# Patient Record
Sex: FEMALE | Race: OTHER | HISPANIC OR LATINO | Employment: UNEMPLOYED | URBAN - METROPOLITAN AREA
[De-identification: names, ages, dates, MRNs, and addresses within clinical notes are randomized per-mention and may not be internally consistent; named-entity substitution may affect disease eponyms.]

---

## 2020-01-03 ENCOUNTER — HOSPITAL ENCOUNTER (EMERGENCY)
Facility: HOSPITAL | Age: 25
Discharge: HOME | End: 2020-01-03
Attending: EMERGENCY MEDICINE | Admitting: EMERGENCY MEDICINE
Payer: COMMERCIAL

## 2020-01-03 VITALS
RESPIRATION RATE: 15 BRPM | DIASTOLIC BLOOD PRESSURE: 71 MMHG | HEART RATE: 74 BPM | OXYGEN SATURATION: 100 % | SYSTOLIC BLOOD PRESSURE: 119 MMHG | TEMPERATURE: 98.4 F

## 2020-01-03 DIAGNOSIS — R11.2 NON-INTRACTABLE VOMITING WITH NAUSEA, UNSPECIFIED VOMITING TYPE: Primary | ICD-10-CM

## 2020-01-03 DIAGNOSIS — R10.84 GENERALIZED ABDOMINAL PAIN: ICD-10-CM

## 2020-01-03 LAB
ALBUMIN SERPL BCP-MCNC: 3.5 G/DL (ref 3.5–5)
ALP SERPL-CCNC: 70 U/L (ref 46–116)
ALT SERPL W P-5'-P-CCNC: 15 U/L (ref 12–78)
ANION GAP SERPL CALCULATED.3IONS-SCNC: 7 MMOL/L (ref 4–13)
AST SERPL W P-5'-P-CCNC: 12 U/L (ref 5–45)
BASOPHILS # BLD AUTO: 0.02 THOUSANDS/ΜL (ref 0–0.1)
BASOPHILS NFR BLD AUTO: 0 % (ref 0–1)
BILIRUB SERPL-MCNC: 0.22 MG/DL (ref 0.2–1)
BILIRUB UR QL STRIP: NEGATIVE
BUN SERPL-MCNC: 8 MG/DL (ref 5–25)
CALCIUM SERPL-MCNC: 8.8 MG/DL (ref 8.3–10.1)
CHLORIDE SERPL-SCNC: 106 MMOL/L (ref 100–108)
CLARITY UR: CLEAR
CLARITY, POC: CLEAR
CO2 SERPL-SCNC: 27 MMOL/L (ref 21–32)
COLOR UR: YELLOW
COLOR, POC: YELLOW
CREAT SERPL-MCNC: 0.65 MG/DL (ref 0.6–1.3)
EOSINOPHIL # BLD AUTO: 0.2 THOUSAND/ΜL (ref 0–0.61)
EOSINOPHIL NFR BLD AUTO: 4 % (ref 0–6)
ERYTHROCYTE [DISTWIDTH] IN BLOOD BY AUTOMATED COUNT: 14.1 % (ref 11.6–15.1)
EXT PREG TEST URINE: NEGATIVE
EXT. CONTROL ED NAV: NORMAL
GFR SERPL CREATININE-BSD FRML MDRD: 124 ML/MIN/1.73SQ M
GLUCOSE SERPL-MCNC: 100 MG/DL (ref 65–140)
GLUCOSE UR STRIP-MCNC: NEGATIVE MG/DL
HCT VFR BLD AUTO: 39 % (ref 34.8–46.1)
HGB BLD-MCNC: 12.3 G/DL (ref 11.5–15.4)
HGB UR QL STRIP.AUTO: NEGATIVE
IMM GRANULOCYTES # BLD AUTO: 0.01 THOUSAND/UL (ref 0–0.2)
IMM GRANULOCYTES NFR BLD AUTO: 0 % (ref 0–2)
KETONES UR STRIP-MCNC: NEGATIVE MG/DL
LEUKOCYTE ESTERASE UR QL STRIP: NEGATIVE
LIPASE SERPL-CCNC: 145 U/L (ref 73–393)
LYMPHOCYTES # BLD AUTO: 1.68 THOUSANDS/ΜL (ref 0.6–4.47)
LYMPHOCYTES NFR BLD AUTO: 30 % (ref 14–44)
MAGNESIUM SERPL-MCNC: 1.9 MG/DL (ref 1.6–2.6)
MCH RBC QN AUTO: 27.7 PG (ref 26.8–34.3)
MCHC RBC AUTO-ENTMCNC: 31.5 G/DL (ref 31.4–37.4)
MCV RBC AUTO: 88 FL (ref 82–98)
MONOCYTES # BLD AUTO: 0.33 THOUSAND/ΜL (ref 0.17–1.22)
MONOCYTES NFR BLD AUTO: 6 % (ref 4–12)
NEUTROPHILS # BLD AUTO: 3.35 THOUSANDS/ΜL (ref 1.85–7.62)
NEUTS SEG NFR BLD AUTO: 60 % (ref 43–75)
NITRITE UR QL STRIP: NEGATIVE
NRBC BLD AUTO-RTO: 0 /100 WBCS
PH UR STRIP.AUTO: 6 [PH] (ref 4.5–8)
PLATELET # BLD AUTO: 215 THOUSANDS/UL (ref 149–390)
PMV BLD AUTO: 10.6 FL (ref 8.9–12.7)
POTASSIUM SERPL-SCNC: 4.2 MMOL/L (ref 3.5–5.3)
PROT SERPL-MCNC: 6.7 G/DL (ref 6.4–8.2)
PROT UR STRIP-MCNC: NEGATIVE MG/DL
RBC # BLD AUTO: 4.44 MILLION/UL (ref 3.81–5.12)
SODIUM SERPL-SCNC: 140 MMOL/L (ref 136–145)
SP GR UR STRIP.AUTO: 1.02 (ref 1–1.03)
UROBILINOGEN UR QL STRIP.AUTO: 0.2 E.U./DL
WBC # BLD AUTO: 5.59 THOUSAND/UL (ref 4.31–10.16)

## 2020-01-03 PROCEDURE — 96361 HYDRATE IV INFUSION ADD-ON: CPT

## 2020-01-03 PROCEDURE — 81025 URINE PREGNANCY TEST: CPT | Performed by: EMERGENCY MEDICINE

## 2020-01-03 PROCEDURE — 83690 ASSAY OF LIPASE: CPT | Performed by: NURSE PRACTITIONER

## 2020-01-03 PROCEDURE — 36415 COLL VENOUS BLD VENIPUNCTURE: CPT | Performed by: NURSE PRACTITIONER

## 2020-01-03 PROCEDURE — 83735 ASSAY OF MAGNESIUM: CPT | Performed by: NURSE PRACTITIONER

## 2020-01-03 PROCEDURE — 80053 COMPREHEN METABOLIC PANEL: CPT | Performed by: NURSE PRACTITIONER

## 2020-01-03 PROCEDURE — 81003 URINALYSIS AUTO W/O SCOPE: CPT

## 2020-01-03 PROCEDURE — 96374 THER/PROPH/DIAG INJ IV PUSH: CPT

## 2020-01-03 PROCEDURE — 99284 EMERGENCY DEPT VISIT MOD MDM: CPT

## 2020-01-03 PROCEDURE — 85025 COMPLETE CBC W/AUTO DIFF WBC: CPT | Performed by: NURSE PRACTITIONER

## 2020-01-03 PROCEDURE — 99284 EMERGENCY DEPT VISIT MOD MDM: CPT | Performed by: EMERGENCY MEDICINE

## 2020-01-03 RX ORDER — ONDANSETRON 4 MG/1
4 TABLET, FILM COATED ORAL EVERY 6 HOURS PRN
Qty: 12 TABLET | Refills: 0 | Status: SHIPPED | OUTPATIENT
Start: 2020-01-03 | End: 2020-01-15 | Stop reason: HOSPADM

## 2020-01-03 RX ORDER — ONDANSETRON 2 MG/ML
4 INJECTION INTRAMUSCULAR; INTRAVENOUS ONCE
Status: COMPLETED | OUTPATIENT
Start: 2020-01-03 | End: 2020-01-03

## 2020-01-03 RX ADMIN — ONDANSETRON 4 MG: 2 INJECTION INTRAMUSCULAR; INTRAVENOUS at 05:37

## 2020-01-03 RX ADMIN — SODIUM CHLORIDE 1000 ML: 0.9 INJECTION, SOLUTION INTRAVENOUS at 05:38

## 2020-01-03 NOTE — ED PROVIDER NOTES
History  Chief Complaint   Patient presents with    Abdominal Pain     generalized abdominal pain and vomiting for one day     This is a 21year old female who states started yesterday with abdominal cramping and vomiting every time she attempts to eat something  She states she was at work last night and had to leave due to vomiting  She denies, fevers, diarrhea  She did not take any thing for her symptoms  States only abdominal surgery is c section and tubal ligation  History provided by:  Medical records and patient   used: No    Abdominal Pain   Pain location:  Generalized  Pain quality: cramping    Progression:  Waxing and waning  Chronicity:  New  Relieved by:  None tried  Worsened by:  Eating  Ineffective treatments:  None tried  Associated symptoms: nausea and vomiting        None       No past medical history on file  Past Surgical History:   Procedure Laterality Date     SECTION      TUBAL LIGATION         No family history on file  I have reviewed and agree with the history as documented  Social History     Tobacco Use    Smoking status: Not on file   Substance Use Topics    Alcohol use: Not on file    Drug use: Not on file        Review of Systems   Constitutional: Negative  HENT: Negative  Eyes: Negative  Respiratory: Negative  Cardiovascular: Negative  Gastrointestinal: Positive for abdominal pain, nausea and vomiting  Endocrine: Negative  Genitourinary: Negative  Musculoskeletal: Negative  Skin: Negative  Allergic/Immunologic: Negative  Neurological: Negative  Hematological: Negative  Psychiatric/Behavioral: Negative  Physical Exam  Physical Exam   Constitutional: She is oriented to person, place, and time  She appears well-developed and well-nourished  Non-toxic appearance  She does not appear ill  No distress  Pt is looking at cell phone when NP enters room      HENT:   Head: Normocephalic and atraumatic  Eyes: Pupils are equal, round, and reactive to light  EOM are normal    Cardiovascular: Normal rate, regular rhythm and normal heart sounds  Pulmonary/Chest: Effort normal and breath sounds normal    Abdominal: Soft  Normal appearance and bowel sounds are normal  There is generalized tenderness  Neurological: She is alert and oriented to person, place, and time  Skin: Skin is warm and dry  Capillary refill takes less than 2 seconds  Psychiatric: She has a normal mood and affect  Her behavior is normal    Nursing note and vitals reviewed        Vital Signs  ED Triage Vitals [01/03/20 0458]   Temperature Pulse Respirations Blood Pressure SpO2   98 4 °F (36 9 °C) 83 20 109/70 98 %      Temp src Heart Rate Source Patient Position - Orthostatic VS BP Location FiO2 (%)   -- Monitor Sitting Right arm --      Pain Score       --           Vitals:    01/03/20 0458   BP: 109/70   Pulse: 83   Patient Position - Orthostatic VS: Sitting         Visual Acuity      ED Medications  Medications   sodium chloride 0 9 % bolus 1,000 mL (1,000 mL Intravenous New Bag 1/3/20 0538)   ondansetron (ZOFRAN) injection 4 mg (4 mg Intravenous Given 1/3/20 0537)       Diagnostic Studies  Results Reviewed     Procedure Component Value Units Date/Time    Comprehensive metabolic panel [[id number]] Collected:  01/03/20 0534    Lab Status:  Final result Specimen:  Blood from Arm, Right Updated:  01/03/20 2060     Sodium 140 mmol/L      Potassium 4 2 mmol/L      Chloride 106 mmol/L      CO2 27 mmol/L      ANION GAP 7 mmol/L      BUN 8 mg/dL      Creatinine 0 65 mg/dL      Glucose 100 mg/dL      Calcium 8 8 mg/dL      AST 12 U/L      ALT 15 U/L      Alkaline Phosphatase 70 U/L      Total Protein 6 7 g/dL      Albumin 3 5 g/dL      Total Bilirubin 0 22 mg/dL      eGFR 124 ml/min/1 73sq m     Narrative:       [place] guidelines for Chronic Kidney Disease (CKD):     Stage 1 with normal or high GFR (GFR > 90 mL/min/1 73 square meters)    Stage 2 Mild CKD (GFR = 60-89 mL/min/1 73 square meters)    Stage 3A Moderate CKD (GFR = 45-59 mL/min/1 73 square meters)    Stage 3B Moderate CKD (GFR = 30-44 mL/min/1 73 square meters)    Stage 4 Severe CKD (GFR = 15-29 mL/min/1 73 square meters)    Stage 5 End Stage CKD (GFR <15 mL/min/1 73 square meters)  Note: GFR calculation is accurate only with a steady state creatinine    Magnesium [[id number]]  (Normal) Collected:  01/03/20 0534    Lab Status:  Final result Specimen:  Blood from Arm, Right Updated:  01/03/20 0625     Magnesium 1 9 mg/dL     Lipase [[id number]]  (Normal) Collected:  01/03/20 0534    Lab Status:  Final result Specimen:  Blood from Arm, Right Updated:  01/03/20 0625     Lipase 145 u/L     POCT urinalysis dipstick [[id number]]  (Normal) Resulted:  01/03/20 0608    Lab Status:  Final result Specimen:  Urine Updated:  01/03/20 0608     Color, UA yellow     Clarity, UA clear    POCT pregnancy, urine [[id number]]  (Normal) Resulted:  01/03/20 0608    Lab Status:  Final result Updated:  01/03/20 0608     EXT PREG TEST UR (Ref: Negative) negative     Control valid    CBC and differential [[id number]] Collected:  01/03/20 0534    Lab Status:  Final result Specimen:  Blood from Arm, Right Updated:  01/03/20 0544     WBC 5 59 Thousand/uL      RBC 4 44 Million/uL      Hemoglobin 12 3 g/dL      Hematocrit 39 0 %      MCV 88 fL      MCH 27 7 pg      MCHC 31 5 g/dL      RDW 14 1 %      MPV 10 6 fL      Platelets 064 Thousands/uL      nRBC 0 /100 WBCs      Neutrophils Relative 60 %      Immat GRANS % 0 %      Lymphocytes Relative 30 %      Monocytes Relative 6 %      Eosinophils Relative 4 %      Basophils Relative 0 %      Neutrophils Absolute 3 35 Thousands/µL      Immature Grans Absolute 0 01 Thousand/uL      Lymphocytes Absolute 1 68 Thousands/µL      Monocytes Absolute 0 33 Thousand/µL      Eosinophils Absolute 0 20 Thousand/µL      Basophils Absolute 0 02 Thousands/µL Urine Macroscopic, POC [[id number]] Collected:  01/03/20 0540    Lab Status:  Final result Specimen:  Urine Updated:  01/03/20 0541     Color, UA Yellow     Clarity, UA Clear     pH, UA 6 0     Leukocytes, UA Negative     Nitrite, UA Negative     Protein, UA Negative mg/dl      Glucose, UA Negative mg/dl      Ketones, UA Negative mg/dl      Urobilinogen, UA 0 2 E U /dl      Bilirubin, UA Negative     Blood, UA Negative     Specific Gravity, UA 1 025    Narrative:       CLINITEK RESULT                 No orders to display              Procedures  Procedures         ED Course  ED Course as of Jan 03 0631   Fri Jan 03, 2020   0619 CBC unremarkable  Urine and HCG - negative  2925 CMP, lipase, magnesium unremarkable  Will have pt attempt po challenge and discharge with zofran if passes  Pt verbalizes understanding of dc instructions and follow up       0631 Pt sipping on  water and tolerating  [facility]  Number of Diagnoses or Management Options  Diagnosis management comments: Abdominal pain with vomiting    Plan  Labs  Urine  uahcg  IVF  zofran  Reassess        Amount and/or Complexity of Data Reviewed  Clinical lab tests: ordered and reviewed  Review and summarize past medical records: yes          Disposition  Final diagnoses:   Non-intractable vomiting with nausea, unspecified vomiting type   Generalized abdominal pain     Time reflects when diagnosis was documented in both MDM as applicable and the Disposition within this note     Time User Action Codes Description Comment    1/3/2020  6:14 AM Krista Sic Add [R11 2] Non-intractable vomiting with nausea, unspecified vomiting type     1/3/2020  6:14 AM Krista Sic Add [R10 84] Generalized abdominal pain       ED Disposition     ED Disposition Condition Date/Time Comment    Discharge Stable Fri Mark 3, 2020  6:15 AM Emigdio Just discharge to home/self care              Follow-up Information     Follow up With Specialties Details Why Contact Info Additional [address] [facility] Emergency Department Emergency Medicine  If symptoms worsen [place] [id number] [phone] [place] ED, [address] [facility]  , [address] [place], [place], [address] [facility] Family Medicine Schedule an appointment as soon as possible for a visit in 2 days call if you need a PCP [address], [address] [facility] [address]  [address], [address], [address] [place], [place], [address] [place] [address]          Patient's Medications   Discharge Prescriptions    ONDANSETRON (ZOFRAN) 4 MG TABLET    Take 1 tablet (4 mg total) by mouth every 6 (six) hours as needed for nausea or vomiting       Start Date: 1/3/2020  End Date: --       Order Dose: 4 mg       Quantity: 12 tablet    Refills: 0     No discharge procedures on file      ED Provider  Electronically Signed by           ULI Marsh  01/03/20 6011

## 2020-01-03 NOTE — DISCHARGE INSTRUCTIONS
You appear to have a viral illness    You have been prescribed zofran for nausea and vomiting  Follow up with your PCP

## 2020-01-10 ENCOUNTER — HOSPITAL ENCOUNTER (EMERGENCY)
Facility: HOSPITAL | Age: 25
Discharge: HOME | End: 2020-01-10
Attending: EMERGENCY MEDICINE | Admitting: EMERGENCY MEDICINE
Payer: COMMERCIAL

## 2020-01-10 ENCOUNTER — APPOINTMENT (EMERGENCY)
Dept: RADIOLOGY | Facility: HOSPITAL | Age: 25
End: 2020-01-10
Payer: COMMERCIAL

## 2020-01-10 VITALS
TEMPERATURE: 98.2 F | HEART RATE: 86 BPM | OXYGEN SATURATION: 97 % | RESPIRATION RATE: 20 BRPM | SYSTOLIC BLOOD PRESSURE: 114 MMHG | DIASTOLIC BLOOD PRESSURE: 62 MMHG

## 2020-01-10 DIAGNOSIS — M25.572 ACUTE LEFT ANKLE PAIN: Primary | ICD-10-CM

## 2020-01-10 PROCEDURE — 73610 X-RAY EXAM OF ANKLE: CPT

## 2020-01-10 PROCEDURE — 99284 EMERGENCY DEPT VISIT MOD MDM: CPT | Performed by: PHYSICIAN ASSISTANT

## 2020-01-10 PROCEDURE — 99283 EMERGENCY DEPT VISIT LOW MDM: CPT

## 2020-01-10 PROCEDURE — 73590 X-RAY EXAM OF LOWER LEG: CPT

## 2020-01-10 RX ORDER — IBUPROFEN 600 MG/1
600 TABLET ORAL ONCE
Status: COMPLETED | OUTPATIENT
Start: 2020-01-10 | End: 2020-01-10

## 2020-01-10 RX ORDER — IBUPROFEN 600 MG/1
600 TABLET ORAL EVERY 6 HOURS PRN
Qty: 30 TABLET | Refills: 0 | Status: SHIPPED | OUTPATIENT
Start: 2020-01-10 | End: 2020-01-15 | Stop reason: HOSPADM

## 2020-01-10 RX ADMIN — IBUPROFEN 600 MG: 600 TABLET ORAL at 06:22

## 2020-01-10 NOTE — ED PROVIDER NOTES
History  Chief Complaint   Patient presents with    Ankle Injury     Pt states that she injured her left ankle yesterday at work  states today pain continues and she feels weakness in her ankle as well as "pins and needles" no pain meds ABRAM     Luis Murillo is a 20 yo F presenting with one day of L ankle pain after being struck along lateral L ankle by a piece of machinery yesterday  States she also felt as though she "rolled" her ankle at that time  Describes pain to lateral ankle which worsens with weight bearing and touching/movement  States she has not been weight bearing since that time  Describes intermittent "pins and needles" in L foot  Pt does describe history of L ankle sprain approximately 3 months ago  History provided by:  Patient   used: No    Ankle Injury   Location:  L ankle  Onset quality:  Sudden  Duration:  1 day  Timing:  Constant  Progression:  Unchanged  Chronicity:  New  Associated symptoms: no abdominal pain, no chest pain, no congestion, no cough, no fever, no nausea, no rash, no rhinorrhea, no shortness of breath, no sore throat, no vomiting and no wheezing        Prior to Admission Medications   Prescriptions Last Dose Informant Patient Reported? Taking?   ondansetron (ZOFRAN) 4 mg tablet Not Taking at Unknown time  No No   Sig: Take 1 tablet (4 mg total) by mouth every 6 (six) hours as needed for nausea or vomiting   Patient not taking: Reported on 1/10/2020      Facility-Administered Medications: None       History reviewed  No pertinent past medical history  Past Surgical History:   Procedure Laterality Date     SECTION      TUBAL LIGATION         History reviewed  No pertinent family history  I have reviewed and agree with the history as documented      Social History     Tobacco Use    Smoking status: Current Every Day Smoker     Packs/day: 0 20    Smokeless tobacco: Never Used   Substance Use Topics    Alcohol use: Not Currently    Drug use: Never        Review of Systems   Constitutional: Negative for chills and fever  HENT: Negative for congestion, rhinorrhea and sore throat  Eyes: Negative for pain and visual disturbance  Respiratory: Negative for cough, shortness of breath and wheezing  Cardiovascular: Negative for chest pain and palpitations  Gastrointestinal: Negative for abdominal pain, nausea and vomiting  Genitourinary: Negative for dysuria, frequency and urgency  Musculoskeletal: Positive for arthralgias and joint swelling  Negative for back pain, neck pain and neck stiffness  Skin: Negative for rash and wound  Neurological: Negative for dizziness, weakness, light-headedness and numbness  Physical Exam  Physical Exam   Constitutional: She is oriented to person, place, and time  She appears well-developed and well-nourished  No distress  HENT:   Head: Normocephalic and atraumatic  Right Ear: External ear normal    Left Ear: External ear normal    Mouth/Throat: Oropharynx is clear and moist    Eyes: Pupils are equal, round, and reactive to light  Conjunctivae and EOM are normal    Neck: Normal range of motion  Neck supple  Cardiovascular: Normal rate, regular rhythm, normal heart sounds and intact distal pulses  Exam reveals no gallop and no friction rub  No murmur heard  Pulmonary/Chest: Effort normal and breath sounds normal  No respiratory distress  She has no wheezes  Abdominal: Soft  She exhibits no distension  There is no tenderness  Musculoskeletal: She exhibits edema and tenderness  TTP to lateral malleolus of L ankle, mild medial malleolar TTP  Proximal fibular TTP present  Slightly decreased ROM L ankle in dorsiflexion and plantarflexion due to pain  Mild edema to lateral ankle and proximal foot  Lymphadenopathy:     She has no cervical adenopathy  Neurological: She is alert and oriented to person, place, and time  She exhibits normal muscle tone   Coordination normal    Skin: Skin is warm and dry  Capillary refill takes less than 2 seconds  No rash noted  She is not diaphoretic  No erythema  Psychiatric: She has a normal mood and affect  Her behavior is normal  Judgment and thought content normal        Vital Signs  ED Triage Vitals [01/10/20 0550]   Temperature Pulse Respirations Blood Pressure SpO2   98 2 °F (36 8 °C) 86 20 114/62 97 %      Temp Source Heart Rate Source Patient Position - Orthostatic VS BP Location FiO2 (%)   Oral Monitor Sitting Right arm --      Pain Score       5           Vitals:    01/10/20 0550   BP: 114/62   Pulse: 86   Patient Position - Orthostatic VS: Sitting         Visual Acuity      ED Medications  Medications   ibuprofen (MOTRIN) tablet 600 mg (600 mg Oral Given 1/10/20 0622)       Diagnostic Studies  Results Reviewed     None                 XR tibia fibula 2 views LEFT    (Results Pending)   XR ankle 3+ vw left    (Results Pending)              Procedures  Procedures         ED Course                               MDM  Number of Diagnoses or Management Options  Acute left ankle pain:   Diagnosis management comments: L ankle pain x1 day after injury/rolling ankle  Recent history of ankle sprain  No acute fracture seen by me on initial xray read  ACE wrap applied and well tolerated, crutches given prior to discharge  Pt instructed to promptly follow up with orthopedics for re-evaluation          Amount and/or Complexity of Data Reviewed  Tests in the radiology section of CPT®: ordered    Patient Progress  Patient progress: stable        Disposition  Final diagnoses:   Acute left ankle pain     Time reflects when diagnosis was documented in both MDM as applicable and the Disposition within this note     Time User Action Codes Description Comment    1/10/2020  7:00 AM Bradd Saver Add [M98 010H] Ankle sprain     1/10/2020  7:00 AM Bradd Saver Remove [B90 804W] Ankle sprain     1/10/2020  7:00 AM Bradd Saver Add [M25 572] Acute left ankle pain ED Disposition     ED Disposition Condition Date/Time Comment    Discharge Stable Fri Mark 10, 2020  6:59 AM Tho Ag discharge to home/self care  Follow-up Information     Follow up With Specialties Details Why Contact Info Additional [address] Loc[facility] within [facility] Specialists [facility] Orthopedic Surgery Schedule an appointment as soon as possible for a visit   [address] [facility] [address] [facility] [address]  [address] [place] [address], [address] [facility], [address] [place] [address], [address] [phone]          Patient's Medications   Discharge Prescriptions    IBUPROFEN (MOTRIN) 600 MG TABLET    Take 1 tablet (600 mg total) by mouth every 6 (six) hours as needed for mild pain or moderate pain       Start Date: 1/10/2020 End Date: --       Order Dose: 600 mg       Quantity: 30 tablet    Refills: 0     No discharge procedures on file      ED Provider  Electronically Signed by           Reji Clarke PA-C  01/10/20 9459

## 2020-01-13 NOTE — PROGRESS NOTES
1  Sprain of unspecified ligament of left ankle, initial encounter       No orders of the defined types were placed in this encounter  Imaging Studies (I personally reviewed images in PACS and report):  01/10/2020 left ankle x-ray:No acute osseous abnormality  01/10/2020 left tib/fib:  No acute osseous abnormality  IMPRESSION:  Left ankle sprain on 01/08/2020  Previous injury to left ankle approximately 3 months ago      Repeat X-ray next visit:   Left ankle and tibia/fibula      Return in about 10 days (around 1/25/2020)  Patient Instructions   Will place patient in Cam boot  Advised to take ibuprofen/Tylenol for pain as needed  Ankle sprains are tears of ligaments in your ankle  Ligaments are fibrous tissues that hold bones together like stitches  Some ligaments such as the ACL in the knee do not heal on their own; however, the ligaments involved in ankle sprain usually do heal without issue over 4-6 weeks  Some people even have relief for more minor sprains within 1-2 weeks  Initial treatment includes PRICE treatment including Protection with ankle splint, Rest with range of motion exercises to prevent stiffness, Ice for 20 minutes every 2-3 hours for the first 2 days or until swelling plateaus, and Elevation to keep the foot and ankle 6-10 inches above your heart when lying down to allow for drainage of fluid from your ankle  Prolonged rest within a few days including immobilization of your ankle in braces, crutches, or Cam boot can result in severe stiffness and worsening of symptoms  As such, please start daily range-of-motion exercises moving your ankle in circles and drawing the alphabet using year big toe as if it were a pencil in the air (MELVIN Chapin 2001)  Physical therapy is also key to helping speed up the healing process as well as for preventing future sprain  Once sprained you are at risk for sprain again for up to 1 year after injury    Physical therapy including home exercises for 8-12 weeks has been shown to be preventative of future sprains (B&K 4th)  You may attempt return to running when walking is no longer painful  I recommended gradual return to running to include a few days of 50% walking/50% jogging 1 mile, followed by 50% jogging/50% running 1 mile, followed by 100% running 1 mile if pain free  I recommend increasing mileage at a slow pace thereafter (AFP Tracey Walker 2001)  I also recommend wearing lace-up ankle brace when playing sports for up to 1 year to prevent the rate of recurrence but not severity of recurrent ankle sprains  (Arch Orthop Trauma Surg  2013 Aug; 133 (8): 0023-3240)  If you have persistent symptoms at 6 weeks then we will consider an MRI of your ankle to evaluate for other injuries that can be hidden such as an osteochondral fracture of the talus bone (B&K4th)  CHIEF COMPLAINT:  Left ankle pain    HPI:  Mick Dakin is a 21 y o  female  who presents for left ankle pain evaluation  Visit 01/15/2020  Patient sustained inversion injury to left ankle on 01/08/2020 at work while pushing a cart  She immediately felt pain and swelling of lateral aspect of her ankle  She was able to ambulate  Subsequently she presented to ER on 01/10/2020  Her x-rays were negative for fractures  She was given Ace wrap and crutches  She was also advised to follow up Orthopedic office  On today's presentation she describes occasional pain to left lateral malleolus, sharp in nature, worsened with ambulation  She denies any tingling, numbness, weakness of her left ankle  Review of Systems   Constitutional: Negative for appetite change, chills, fever and unexpected weight change  HENT: Negative for hearing loss, nosebleeds and sore throat  Eyes: Negative for photophobia, pain, redness and visual disturbance  Respiratory: Negative for cough, shortness of breath and wheezing      Cardiovascular: Negative for chest pain, palpitations and leg swelling  Gastrointestinal: Negative for abdominal pain, nausea and vomiting  Endocrine: Negative for polydipsia and polyuria  Genitourinary: Negative for dysuria and hematuria  Musculoskeletal: Negative for arthralgias, joint swelling and myalgias  Skin: Negative for rash and wound  Neurological: Negative for dizziness, numbness and headaches  Psychiatric/Behavioral: Negative for decreased concentration and suicidal ideas  The patient is not nervous/anxious  Following history reviewed and update:    History reviewed  No pertinent past medical history  Past Surgical History:   Procedure Laterality Date     SECTION      TOOTH EXTRACTION      TUBAL LIGATION       Social History   Social History     Substance and Sexual Activity   Alcohol Use Not Currently     Social History     Substance and Sexual Activity   Drug Use Never     Social History     Tobacco Use   Smoking Status Current Every Day Smoker    Packs/day: 0 20   Smokeless Tobacco Never Used     No family history on file  No Known Allergies       Physical Exam  /82 (BP Location: Left arm, Patient Position: Sitting, Cuff Size: Adult)   Pulse 90   Ht 5' 3 5" (1 613 m)   Wt 97 1 kg (214 lb)   LMP 2020 (Exact Date)   BMI 37 31 kg/m²     Constitutional:  see vital signs  Gen: well-developed, normocephalic/atraumatic, well-groomed  Eyes: No inflammation or discharge of conjunctiva or lids; sclera clear   Pharynx: no inflammation, lesion, or mass of lips  Neck: supple, no masses, non-distended  MSK: no inflammation, lesion, mass, or clubbing of nails and digits except for other than mentioned below  SKIN: no visible rashes or skin lesions  Pulmonary/Chest: Effort normal  No respiratory distress     NEURO: cranial nerves grossly intact  PSYCH:  Alert and oriented to person, place, and time; recent and remote memory intact; mood normal, no depression, anxiety, or agitation, judgment and insight good and intact Ortho Exam   Left ANKLE & FOOT EXAM  Observation  GAIT:  normal    Inspection  Erythema: no  Ecchymosis: no  Edema:  none      Tenderness  Proximal Fibula:   Yes mild  (Maisonneuve frx)  AiTFL: no  (2cm proximal-medial to tip lateral malleolus 92% sens, 29% spec)  ATFL:  Yes++ reproduces chief complaint of pain  CFL: no  PTFL: no  Achilles:  Insertion , yes  deltoid: No  Peroneal: no  Tibialis Anterior: no  Tibialis Posterior: no    Bony Tenderpoints:  Lateral Malleolus: no  Base of 5th MT: no  Medial Malleolus: no  Navicular: no  Talar dome: No    ROM  Dorsiflexion: intact  Plantarflexion: intact    Muscle Strength  Pronation: intact without pain  Supination: intact without pain    Tib-Fib Squeeze: negative  (umjowdihjygf-vt-zhudhwdyvtegub squeeze; 26% sens, 88% spec; rule in test)    Calcaneal Squeeze: negative    Dorsiflexion (+) ER Stress Test: negative  (reproduce ATiFL mech; 71% sens, 63% spec)       Left Calf exam:  No swelling erythema or increased warmth  No palpable cords  Tenderness: none  Sudha Dorsiflexion DVT Test: Negative  (slight knee flexion, passive abrupt ankle dorsiflexion, squeeze calf)    Left foot exam  No swelling, erythema or increased warmth  Tenderness: none  ROM Toes extension: intact  ROM Toes flexion: intact  Strength Toes: 5/5 flex, ext  Sensation intact  Capillary refill intact    Left Lisfranc Assessment:  Plantar Ecchymosis:  None  Pronation Abduction test:  None(forefoot abducted, pronate foot, hindfoot kept in place)  Tarsometatarsal Squeeze test:  Negative  (thumb lateral midfoot, other fingers medial midfoot, squeeze 1st & 5th rays)  Single Limb Heel Rise:  (unilateral stand on "tip toe":)  Piano Key Test:  Negative  (hindfoot stabilized, passive dorsiflexion & plantar flexion at TMT joint)      LEFT ACHILLES EXAMINATION:  Simmonds Triad:  Palpable Gap or Defect of Achilles: none  Angle of Declination: angle of baseline plantarflexion symmetric to contralateral side  Emilio Test (patient prone, intact and symmetric plantarflexion of ankle when flexing knee): intact  Sterling's Calf Squeeze Test: intact obligatory plantarflexion        Procedures

## 2020-01-15 ENCOUNTER — OFFICE VISIT (OUTPATIENT)
Dept: OBGYN CLINIC | Facility: MEDICAL CENTER | Age: 25
End: 2020-01-15
Payer: COMMERCIAL

## 2020-01-15 VITALS
HEART RATE: 90 BPM | WEIGHT: 214 LBS | DIASTOLIC BLOOD PRESSURE: 82 MMHG | HEIGHT: 64 IN | BODY MASS INDEX: 36.54 KG/M2 | SYSTOLIC BLOOD PRESSURE: 130 MMHG

## 2020-01-15 DIAGNOSIS — S93.402A SPRAIN OF UNSPECIFIED LIGAMENT OF LEFT ANKLE, INITIAL ENCOUNTER: Primary | ICD-10-CM

## 2020-01-15 PROCEDURE — 99203 OFFICE O/P NEW LOW 30 MIN: CPT | Performed by: FAMILY MEDICINE

## 2020-01-15 RX ORDER — PREDNISONE 10 MG/1
TABLET ORAL
COMMUNITY
Start: 2019-12-04 | End: 2020-01-15 | Stop reason: HOSPADM

## 2020-01-15 RX ORDER — ALBUTEROL SULFATE 90 UG/1
1 AEROSOL, METERED RESPIRATORY (INHALATION) EVERY 6 HOURS PRN
COMMUNITY
Start: 2019-12-04

## 2020-01-15 RX ORDER — PREDNISOLONE ACETATE 10 MG/ML
SUSPENSION/ DROPS OPHTHALMIC
COMMUNITY
Start: 2019-12-26 | End: 2020-01-15 | Stop reason: HOSPADM

## 2020-01-15 RX ORDER — IBUPROFEN 600 MG/1
600 TABLET ORAL EVERY 6 HOURS PRN
COMMUNITY
Start: 2019-12-31

## 2020-01-15 RX ORDER — FERROUS SULFATE 325(65) MG
325 TABLET ORAL
COMMUNITY
Start: 2018-07-18 | End: 2020-01-15 | Stop reason: HOSPADM

## 2020-01-15 RX ORDER — VALACYCLOVIR HYDROCHLORIDE 1 G/1
1000 TABLET, FILM COATED ORAL
COMMUNITY
End: 2020-01-15 | Stop reason: HOSPADM

## 2020-01-15 RX ORDER — AZITHROMYCIN 250 MG/1
TABLET, FILM COATED ORAL
COMMUNITY
Start: 2019-12-04 | End: 2020-01-15 | Stop reason: HOSPADM

## 2020-01-15 RX ORDER — KETOTIFEN FUMARATE 0.25 MG/ML
SOLUTION/ DROPS OPHTHALMIC
COMMUNITY
Start: 2019-11-14 | End: 2020-01-15 | Stop reason: HOSPADM

## 2020-01-15 RX ORDER — AMOXICILLIN 500 MG/1
CAPSULE ORAL
COMMUNITY
Start: 2019-11-26 | End: 2020-01-15 | Stop reason: HOSPADM

## 2020-01-15 RX ORDER — TOBRAMYCIN 3 MG/ML
SOLUTION/ DROPS OPHTHALMIC
COMMUNITY
Start: 2019-12-25 | End: 2020-01-15 | Stop reason: HOSPADM

## 2020-01-15 NOTE — PATIENT INSTRUCTIONS
Will place patient in Cam boot  Advised to take ibuprofen/Tylenol for pain as needed  Ankle sprains are tears of ligaments in your ankle  Ligaments are fibrous tissues that hold bones together like stitches  Some ligaments such as the ACL in the knee do not heal on their own; however, the ligaments involved in ankle sprain usually do heal without issue over 4-6 weeks  Some people even have relief for more minor sprains within 1-2 weeks  Initial treatment includes PRICE treatment including Protection with ankle splint, Rest with range of motion exercises to prevent stiffness, Ice for 20 minutes every 2-3 hours for the first 2 days or until swelling plateaus, and Elevation to keep the foot and ankle 6-10 inches above your heart when lying down to allow for drainage of fluid from your ankle  Prolonged rest within a few days including immobilization of your ankle in braces, crutches, or Cam boot can result in severe stiffness and worsening of symptoms  As such, please start daily range-of-motion exercises moving your ankle in circles and drawing the alphabet using year big toe as if it were a pencil in the air (AFP Hector Aloe 2001)  Physical therapy is also key to helping speed up the healing process as well as for preventing future sprain  Once sprained you are at risk for sprain again for up to 1 year after injury  Physical therapy including home exercises for 8-12 weeks has been shown to be preventative of future sprains (B&K 4th)  You may attempt return to running when walking is no longer painful  I recommended gradual return to running to include a few days of 50% walking/50% jogging 1 mile, followed by 50% jogging/50% running 1 mile, followed by 100% running 1 mile if pain free  I recommend increasing mileage at a slow pace thereafter (AFP Hector Aloe 2001)      I also recommend wearing lace-up ankle brace when playing sports for up to 1 year to prevent the rate of recurrence but not severity of recurrent ankle sprains  (Arch Orthop Trauma Surg  2013 Aug; 133 (8): 5583-4157)  If you have persistent symptoms at 6 weeks then we will consider an MRI of your ankle to evaluate for other injuries that can be hidden such as an osteochondral fracture of the talus bone (B&K4th)

## 2020-01-15 NOTE — LETTER
January 15, 2020     Patient: Coco Hubbard   YOB: 1995   Date of Visit: 1/15/2020       To Whom it May Concern:    Lula Tyler is under my professional care  She was seen in my office on 1/15/2020  She may return to work on 01/17/2020  If you have any questions or concerns, please don't hesitate to call  Sincerely,          Vinicius Whiteside III, DO        CC: Shelley Barahona

## 2020-01-23 ENCOUNTER — OFFICE VISIT (OUTPATIENT)
Dept: OBGYN CLINIC | Facility: OTHER | Age: 25
End: 2020-01-23
Payer: COMMERCIAL

## 2020-01-23 ENCOUNTER — APPOINTMENT (OUTPATIENT)
Dept: RADIOLOGY | Facility: OTHER | Age: 25
End: 2020-01-23
Payer: COMMERCIAL

## 2020-01-23 VITALS
DIASTOLIC BLOOD PRESSURE: 77 MMHG | SYSTOLIC BLOOD PRESSURE: 114 MMHG | BODY MASS INDEX: 37.31 KG/M2 | WEIGHT: 214 LBS | HEART RATE: 84 BPM

## 2020-01-23 DIAGNOSIS — S93.402D SPRAIN OF UNSPECIFIED LIGAMENT OF LEFT ANKLE, SUBSEQUENT ENCOUNTER: ICD-10-CM

## 2020-01-23 DIAGNOSIS — S99.912A ANKLE INJURY, LEFT, INITIAL ENCOUNTER: Primary | ICD-10-CM

## 2020-01-23 DIAGNOSIS — S93.402A SPRAIN OF UNSPECIFIED LIGAMENT OF LEFT ANKLE, INITIAL ENCOUNTER: ICD-10-CM

## 2020-01-23 PROCEDURE — 73590 X-RAY EXAM OF LOWER LEG: CPT

## 2020-01-23 PROCEDURE — 99214 OFFICE O/P EST MOD 30 MIN: CPT | Performed by: FAMILY MEDICINE

## 2020-01-23 PROCEDURE — 73610 X-RAY EXAM OF ANKLE: CPT

## 2020-01-23 NOTE — PATIENT INSTRUCTIONS
Handout given for ankle exercises and theraband  Return to work with lace up ankle brace  Recommend against climbing ladders  Ankle sprains are tears of ligaments in your ankle  Ligaments are fibrous tissues that hold bones together like stitches  Some ligaments such as the ACL in the knee do not heal on their own; however, the ligaments involved in ankle sprain usually do heal without issue over 4-6 weeks  Some people even have relief for more minor sprains within 1-2 weeks  Initial treatment includes PRICE treatment including Protection with ankle splint, Rest with range of motion exercises to prevent stiffness, Ice for 20 minutes every 2-3 hours for the first 2 days or until swelling plateaus, and Elevation to keep the foot and ankle 6-10 inches above your heart when lying down to allow for drainage of fluid from your ankle  Prolonged rest within a few days including immobilization of your ankle in braces, crutches, or Cam boot can result in severe stiffness and worsening of symptoms  As such, please start daily range-of-motion exercises moving your ankle in circles and drawing the alphabet using year big toe as if it were a pencil in the air (AFP Jessica Contreras 2001)  Physical therapy is also key to helping speed up the healing process as well as for preventing future sprain  Once sprained you are at risk for sprain again for up to 1 year after injury  Physical therapy including home exercises for 8-12 weeks has been shown to be preventative of future sprains (B&K 4th)  You may attempt return to running when walking is no longer painful  I recommended gradual return to running to include a few days of 50% walking/50% jogging 1 mile, followed by 50% jogging/50% running 1 mile, followed by 100% running 1 mile if pain free  I recommend increasing mileage at a slow pace thereafter (AFP Jessica Contreras 2001)      I also recommend wearing lace-up ankle brace when playing sports for up to 1 year to prevent the rate of recurrence but not severity of recurrent ankle sprains  (Arch Orthop Trauma Surg  2013 Aug; 133 (8): 0980-9831)  If you have persistent symptoms at 6 weeks then we will consider an MRI of your ankle to evaluate for other injuries that can be hidden such as an osteochondral fracture of the talus bone (B&K4th)  Mastoid Interpolation Flap Text: A decision was made to reconstruct the defect utilizing an interpolation axial flap and a staged reconstruction.  A telfa template was made of the defect.  This telfa template was then used to outline the mastoid interpolation flap.  The donor area for the pedicle flap was then injected with anesthesia.  The flap was excised through the skin and subcutaneous tissue down to the layer of the underlying musculature.  The pedicle flap was carefully excised within this deep plane to maintain its blood supply.  The edges of the donor site were undermined.   The donor site was closed in a primary fashion.  The pedicle was then rotated into position and sutured.  Once the tube was sutured into place, adequate blood supply was confirmed with blanching and refill.  The pedicle was then wrapped with xeroform gauze and dressed appropriately with a telfa and gauze bandage to ensure continued blood supply and protect the attached pedicle.

## 2020-01-23 NOTE — PROGRESS NOTES
1  Ankle injury, left, initial encounter  XR ankle 3+ vw left    XR tibia fibula 2 vw left    [facility]   2  Sprain of unspecified ligament of left ankle, initial encounter  XR ankle 2 vw left    [facility]     Orders Placed This Encounter   Procedures    XR ankle 3+ vw left    XR tibia fibula 2 vw left    XR ankle 2 vw left    [facility]        Imaging Studies (I personally reviewed images in PACS and report):  X-ray left ankle 01/23/2020:  No acute osseous abnormality  X-ray left ankle stress mortise view manually performed by physician 01/23/2020:  Stable alignment  X-ray left tibia-fibula 01/23/2020:  No acute osseous abnormality  IMPRESSION:  Left ankle sprain on 01/08/2020  Previous injury to left ankle approximately 3 months ago  ATFL sprain and ATiFL sprain with intact syndesmosis on manual stress mortise  FUI: 2 weeks 1 day       Repeat X-ray next visit:   None      Return in about 4 weeks (around 2/20/2020)  Patient Instructions   Handout given for ankle exercises and theraband  Return to work with lace up ankle brace  Recommend against climbing ladders  Ankle sprains are tears of ligaments in your ankle  Ligaments are fibrous tissues that hold bones together like stitches  Some ligaments such as the ACL in the knee do not heal on their own; however, the ligaments involved in ankle sprain usually do heal without issue over 4-6 weeks  Some people even have relief for more minor sprains within 1-2 weeks  Initial treatment includes PRICE treatment including Protection with ankle splint, Rest with range of motion exercises to prevent stiffness, Ice for 20 minutes every 2-3 hours for the first 2 days or until swelling plateaus, and Elevation to keep the foot and ankle 6-10 inches above your heart when lying down to allow for drainage of fluid from your ankle   Prolonged rest within a few days including immobilization of your ankle in braces, crutches, or Cam boot can result in severe stiffness and worsening of symptoms  As such, please start daily range-of-motion exercises moving your ankle in circles and drawing the alphabet using year big toe as if it were a pencil in the air (AFP Manju Episcopalian 2001)  Physical therapy is also key to helping speed up the healing process as well as for preventing future sprain  Once sprained you are at risk for sprain again for up to 1 year after injury  Physical therapy including home exercises for 8-12 weeks has been shown to be preventative of future sprains (B&K 4th)  You may attempt return to running when walking is no longer painful  I recommended gradual return to running to include a few days of 50% walking/50% jogging 1 mile, followed by 50% jogging/50% running 1 mile, followed by 100% running 1 mile if pain free  I recommend increasing mileage at a slow pace thereafter (AFP Manju Figueredo 2001)  I also recommend wearing lace-up ankle brace when playing sports for up to 1 year to prevent the rate of recurrence but not severity of recurrent ankle sprains  (Arch Orthop Trauma Surg  2013 Aug; 133 (8): 8309-7595)  If you have persistent symptoms at 6 weeks then we will consider an MRI of your ankle to evaluate for other injuries that can be hidden such as an osteochondral fracture of the talus bone (B&K4th)  CHIEF COMPLAINT:  F/u left ankle injury    HPI:  Tho Ag is a 21 y o  female  who presents for       Visit 1/23/20:  F/u left ankle injury: mild improvement since last visit  Points lateral anterolateral ankle as source of persistent pain  Self-discontinued CAM boot due to LE discomfort in boot  Denies any calf pain or swelling today  Currently not working as job will not accommodate restrictions  Patient states she has children and is in poor financial situation being out of work   She states she desires to attempt to return to work with her injury so that she may reacquire her source of income for her family  Overall feels 50% of usual baseline  Review of Systems   Constitutional: Negative for chills, fever and unexpected weight change  HENT: Negative for hearing loss, nosebleeds and sore throat  Eyes: Negative for pain, redness and visual disturbance  Respiratory: Negative for cough, shortness of breath and wheezing  Cardiovascular: Negative for chest pain, palpitations and leg swelling  Gastrointestinal: Negative for abdominal distention, nausea and vomiting  Endocrine: Negative for polydipsia and polyuria  Genitourinary: Negative for dysuria and hematuria  Skin: Negative for rash and wound  Neurological: Negative for dizziness, numbness and headaches  Psychiatric/Behavioral: Negative for decreased concentration and suicidal ideas  Following history reviewed and update:    No past medical history on file  Past Surgical History:   Procedure Laterality Date     SECTION      TOOTH EXTRACTION      TUBAL LIGATION       Social History   Social History     Substance and Sexual Activity   Alcohol Use Not Currently     Social History     Substance and Sexual Activity   Drug Use Never     Social History     Tobacco Use   Smoking Status Current Every Day Smoker    Packs/day: 0 20   Smokeless Tobacco Never Used     No family history on file    No Known Allergies       Physical Exam  /77 (BP Location: Right arm, Patient Position: Sitting, Cuff Size: Adult)   Pulse 84   Wt 97 1 kg (214 lb)   LMP 2020 (Exact Date)   BMI 37 31 kg/m²     Constitutional:  see vital signs  Gen: well-developed, normocephalic/atraumatic, well-groomed  Eyes: No inflammation or discharge of conjunctiva or lids; sclera clear   Pharynx: no inflammation, lesion, or mass of lips  Neck: supple, no masses, non-distended  MSK: no inflammation, lesion, mass, or clubbing of nails and digits except for other than mentioned below  SKIN: no visible rashes or skin lesions  Pulmonary/Chest: Effort normal  No respiratory distress     NEURO: cranial nerves grossly intact  PSYCH:  Alert and oriented to person, place, and time; recent and remote memory intact; mood normal, no depression, anxiety, or agitation, judgment and insight good and intact     Ortho Exam    LEFT ANKLE & FOOT EXAM  Observation  GAIT:  Normal in lace up ankle brace; mild antalgic out of brace  Able to perform single legged hop test with left ankle minimal to mild pain    Inspection  Erythema: no  Ecchymosis: no  Edema:  none    Tenderness  Proximal Fibula: no  (Maisonneuve frx)  AiTFL: +mild  (2cm proximal-medial to tip lateral malleolus 92% sens, 29% spec)  ATFL: ++ moderate  CFL: no  PTFL: no  Achilles:  no  deltoid: No  Peroneal: no  Tibialis Anterior: no  Tibialis Posterior: no     Bony Tenderpoints:  Lateral Malleolus: no  Base of 5th MT: no  Medial Malleolus: no  Navicular: no  Talar dome: No    ROM  Dorsiflexion: intact  Plantarflexion: intact    Muscle Strength  Pronation: intact without pain  Supination: intact without pain    Tib-Fib Squeeze: negative  (dufmhcoherhl-nl-saahmaqibskzob squeeze; 26% sens, 88% spec; rule in test)    Calcaneal Squeeze: negative    Dorsiflexion (+) ER Stress Test: + at talofibular ligament but not tibia-fibular ligament  (reproduce ATiFL mech; 71% sens, 63% spec)     Left Calf exam:  No swelling erythema or increased warmth  No palpable cords  Tenderness: none  Sudha Dorsiflexion DVT Test: Negative  (slight knee flexion, passive abrupt ankle dorsiflexion, squeeze calf)    Left foot exam  No swelling, erythema or increased warmth  Tenderness: none  ROM Toes extension: intact  ROM Toes flexion: intact  Strength Toes: 5/5 flex, ext  Sensation intact  Capillary refill intact    Left Lisfranc Assessment:  Plantar Ecchymosis: neg  Pronation Abduction test: neg  (forefoot abducted, pronate foot, hindfoot kept in place)  Tarsometatarsal Squeeze test: neg  (thumb lateral midfoot, other fingers medial midfoot, squeeze 1st & 5th rays)  Single Limb Heel Rise: neg  (unilateral stand on "tip toe":)  Piano Key Test: neg  (hindfoot stabilized, passive dorsiflexion & plantar flexion at TMT joint)          Procedures      Visit 50+ minute with >50% spent counseling patient about her injury, reviewing images, performing stress ankle mortise and counseling reasoning for performing test, and discussing work restrictions

## 2020-01-23 NOTE — LETTER
January 23, 2020     Patient: Adeel Barreto   YOB: 1995   Date of Visit: 1/23/2020       To Whom it May Concern:    Earnest Geovanna is under my professional care  She was seen in my office on 1/23/2020  Patient return to work on 01/24/2020 full duty no restrictions  I recommend wearing lace-up ankle brace during work  If you have any questions or concerns, please don't hesitate to call           Sincerely,          Burak Arenas III, DO        CC: No Recipients

## 2020-01-28 ENCOUNTER — HOSPITAL ENCOUNTER (EMERGENCY)
Facility: HOSPITAL | Age: 25
Discharge: HOME | End: 2020-01-28
Attending: EMERGENCY MEDICINE | Admitting: EMERGENCY MEDICINE
Payer: COMMERCIAL

## 2020-01-28 VITALS
TEMPERATURE: 98.1 F | SYSTOLIC BLOOD PRESSURE: 131 MMHG | DIASTOLIC BLOOD PRESSURE: 69 MMHG | OXYGEN SATURATION: 97 % | HEART RATE: 83 BPM | RESPIRATION RATE: 18 BRPM

## 2020-01-28 DIAGNOSIS — R10.84 GENERALIZED ABDOMINAL PAIN: Primary | ICD-10-CM

## 2020-01-28 DIAGNOSIS — R11.2 NON-INTRACTABLE VOMITING WITH NAUSEA, UNSPECIFIED VOMITING TYPE: ICD-10-CM

## 2020-01-28 LAB
ALBUMIN SERPL BCP-MCNC: 4 G/DL (ref 3.5–5)
ALP SERPL-CCNC: 74 U/L (ref 46–116)
ALT SERPL W P-5'-P-CCNC: 19 U/L (ref 12–78)
ANION GAP SERPL CALCULATED.3IONS-SCNC: 9 MMOL/L (ref 4–13)
AST SERPL W P-5'-P-CCNC: 8 U/L (ref 5–45)
BACTERIA UR QL AUTO: ABNORMAL /HPF
BASOPHILS # BLD AUTO: 0.03 THOUSANDS/ΜL (ref 0–0.1)
BASOPHILS NFR BLD AUTO: 0 % (ref 0–1)
BILIRUB SERPL-MCNC: 0.18 MG/DL (ref 0.2–1)
BILIRUB UR QL STRIP: NEGATIVE
BUN SERPL-MCNC: 12 MG/DL (ref 5–25)
CALCIUM SERPL-MCNC: 9 MG/DL (ref 8.3–10.1)
CHLORIDE SERPL-SCNC: 104 MMOL/L (ref 100–108)
CLARITY UR: CLEAR
CO2 SERPL-SCNC: 28 MMOL/L (ref 21–32)
COLOR UR: YELLOW
CREAT SERPL-MCNC: 0.71 MG/DL (ref 0.6–1.3)
EOSINOPHIL # BLD AUTO: 0.09 THOUSAND/ΜL (ref 0–0.61)
EOSINOPHIL NFR BLD AUTO: 1 % (ref 0–6)
ERYTHROCYTE [DISTWIDTH] IN BLOOD BY AUTOMATED COUNT: 13.8 % (ref 11.6–15.1)
EXT PREG TEST URINE: NEGATIVE
EXT. CONTROL ED NAV: NORMAL
GFR SERPL CREATININE-BSD FRML MDRD: 119 ML/MIN/1.73SQ M
GLUCOSE SERPL-MCNC: 99 MG/DL (ref 65–140)
GLUCOSE UR STRIP-MCNC: NEGATIVE MG/DL
HCT VFR BLD AUTO: 42.7 % (ref 34.8–46.1)
HGB BLD-MCNC: 13.8 G/DL (ref 11.5–15.4)
HGB UR QL STRIP.AUTO: NEGATIVE
IMM GRANULOCYTES # BLD AUTO: 0.03 THOUSAND/UL (ref 0–0.2)
IMM GRANULOCYTES NFR BLD AUTO: 0 % (ref 0–2)
KETONES UR STRIP-MCNC: NEGATIVE MG/DL
LEUKOCYTE ESTERASE UR QL STRIP: ABNORMAL
LIPASE SERPL-CCNC: 130 U/L (ref 73–393)
LYMPHOCYTES # BLD AUTO: 1.61 THOUSANDS/ΜL (ref 0.6–4.47)
LYMPHOCYTES NFR BLD AUTO: 20 % (ref 14–44)
MCH RBC QN AUTO: 28.3 PG (ref 26.8–34.3)
MCHC RBC AUTO-ENTMCNC: 32.3 G/DL (ref 31.4–37.4)
MCV RBC AUTO: 88 FL (ref 82–98)
MONOCYTES # BLD AUTO: 0.56 THOUSAND/ΜL (ref 0.17–1.22)
MONOCYTES NFR BLD AUTO: 7 % (ref 4–12)
NEUTROPHILS # BLD AUTO: 5.74 THOUSANDS/ΜL (ref 1.85–7.62)
NEUTS SEG NFR BLD AUTO: 72 % (ref 43–75)
NITRITE UR QL STRIP: NEGATIVE
NON-SQ EPI CELLS URNS QL MICRO: ABNORMAL /HPF
NRBC BLD AUTO-RTO: 0 /100 WBCS
PH UR STRIP.AUTO: 6 [PH] (ref 4.5–8)
PLATELET # BLD AUTO: 229 THOUSANDS/UL (ref 149–390)
PMV BLD AUTO: 11 FL (ref 8.9–12.7)
POTASSIUM SERPL-SCNC: 4 MMOL/L (ref 3.5–5.3)
PROT SERPL-MCNC: 7.7 G/DL (ref 6.4–8.2)
PROT UR STRIP-MCNC: ABNORMAL MG/DL
RBC # BLD AUTO: 4.87 MILLION/UL (ref 3.81–5.12)
RBC #/AREA URNS AUTO: ABNORMAL /HPF
SODIUM SERPL-SCNC: 141 MMOL/L (ref 136–145)
SP GR UR STRIP.AUTO: 1.02 (ref 1–1.03)
UROBILINOGEN UR QL STRIP.AUTO: 0.2 E.U./DL
WBC # BLD AUTO: 8.06 THOUSAND/UL (ref 4.31–10.16)
WBC #/AREA URNS AUTO: ABNORMAL /HPF

## 2020-01-28 PROCEDURE — 81001 URINALYSIS AUTO W/SCOPE: CPT

## 2020-01-28 PROCEDURE — 83690 ASSAY OF LIPASE: CPT | Performed by: EMERGENCY MEDICINE

## 2020-01-28 PROCEDURE — 85025 COMPLETE CBC W/AUTO DIFF WBC: CPT | Performed by: EMERGENCY MEDICINE

## 2020-01-28 PROCEDURE — 81025 URINE PREGNANCY TEST: CPT | Performed by: EMERGENCY MEDICINE

## 2020-01-28 PROCEDURE — 96374 THER/PROPH/DIAG INJ IV PUSH: CPT

## 2020-01-28 PROCEDURE — 80053 COMPREHEN METABOLIC PANEL: CPT | Performed by: EMERGENCY MEDICINE

## 2020-01-28 PROCEDURE — 96361 HYDRATE IV INFUSION ADD-ON: CPT

## 2020-01-28 PROCEDURE — 96375 TX/PRO/DX INJ NEW DRUG ADDON: CPT

## 2020-01-28 PROCEDURE — 99284 EMERGENCY DEPT VISIT MOD MDM: CPT

## 2020-01-28 PROCEDURE — 99284 EMERGENCY DEPT VISIT MOD MDM: CPT | Performed by: EMERGENCY MEDICINE

## 2020-01-28 PROCEDURE — 36415 COLL VENOUS BLD VENIPUNCTURE: CPT | Performed by: EMERGENCY MEDICINE

## 2020-01-28 RX ORDER — ONDANSETRON 4 MG/1
4 TABLET, ORALLY DISINTEGRATING ORAL EVERY 8 HOURS PRN
Qty: 20 TABLET | Refills: 0 | Status: SHIPPED | OUTPATIENT
Start: 2020-01-28

## 2020-01-28 RX ORDER — DICYCLOMINE HCL 20 MG
20 TABLET ORAL 2 TIMES DAILY
Qty: 20 TABLET | Refills: 0 | Status: SHIPPED | OUTPATIENT
Start: 2020-01-28

## 2020-01-28 RX ORDER — ONDANSETRON 2 MG/ML
4 INJECTION INTRAMUSCULAR; INTRAVENOUS ONCE
Status: COMPLETED | OUTPATIENT
Start: 2020-01-28 | End: 2020-01-28

## 2020-01-28 RX ORDER — KETOROLAC TROMETHAMINE 30 MG/ML
30 INJECTION, SOLUTION INTRAMUSCULAR; INTRAVENOUS ONCE
Status: COMPLETED | OUTPATIENT
Start: 2020-01-28 | End: 2020-01-28

## 2020-01-28 RX ORDER — DICYCLOMINE HCL 20 MG
20 TABLET ORAL ONCE
Status: COMPLETED | OUTPATIENT
Start: 2020-01-28 | End: 2020-01-28

## 2020-01-28 RX ADMIN — SODIUM CHLORIDE 1000 ML: 0.9 INJECTION, SOLUTION INTRAVENOUS at 05:51

## 2020-01-28 RX ADMIN — KETOROLAC TROMETHAMINE 30 MG: 30 INJECTION, SOLUTION INTRAMUSCULAR at 06:02

## 2020-01-28 RX ADMIN — DICYCLOMINE HYDROCHLORIDE 20 MG: 20 TABLET ORAL at 06:36

## 2020-01-28 RX ADMIN — ONDANSETRON 4 MG: 2 INJECTION INTRAMUSCULAR; INTRAVENOUS at 06:01

## 2020-01-28 NOTE — ED PROVIDER NOTES
History  Chief Complaint   Patient presents with    Abdominal Pain     Pt reports generalized abd pain and vomiting since today  Pt is a 21year old female with a PMH of  and tubal ligation presenting with abdominal pain x today  Pt states she has generalized abdominal pain that began suddenly after waking up  States she felt gassy and needed to use the bathroom  She had BM, but pain is still present  Associated n/v but no diarrhea  Denies fevers or urinary symptoms  Works at [employer]  Prior to Admission Medications   Prescriptions Last Dose Informant Patient Reported? Taking? albuterol (PROVENTIL HFA,VENTOLIN HFA) 90 mcg/act inhaler   Yes No   Sig: Inhale 1 puff every 6 (six) hours as needed   ibuprofen (MOTRIN) 600 mg tablet   Yes No   Sig: Take 600 mg by mouth every 6 (six) hours as needed      Facility-Administered Medications: None       History reviewed  No pertinent past medical history  Past Surgical History:   Procedure Laterality Date     SECTION      TOOTH EXTRACTION      TUBAL LIGATION         History reviewed  No pertinent family history  I have reviewed and agree with the history as documented  Social History     Tobacco Use    Smoking status: Current Some Day Smoker     Packs/day: 0 20    Smokeless tobacco: Never Used   Substance Use Topics    Alcohol use: Not Currently    Drug use: Never        Review of Systems   Constitutional: Negative  HENT: Negative  Respiratory: Negative  Cardiovascular: Negative  Gastrointestinal: Positive for abdominal pain, nausea and vomiting  Negative for abdominal distention, blood in stool, constipation and diarrhea  Genitourinary: Negative  Musculoskeletal: Negative  Neurological: Negative  Physical Exam  Physical Exam   Constitutional: She is oriented to person, place, and time  She appears well-developed and well-nourished  No distress  HENT:   Head: Normocephalic and atraumatic     Right Ear: External ear normal    Left Ear: External ear normal    Nose: Nose normal    Eyes: Conjunctivae and EOM are normal    Neck: Normal range of motion  Neck supple  Cardiovascular: Normal rate, regular rhythm, normal heart sounds and intact distal pulses  Pulmonary/Chest: Effort normal and breath sounds normal    Abdominal: Soft  Normal appearance and bowel sounds are normal  There is generalized tenderness  There is no rigidity, no rebound and no guarding  Musculoskeletal: Normal range of motion  Neurological: She is alert and oriented to person, place, and time  Skin: Skin is warm and dry  She is not diaphoretic         Vital Signs  ED Triage Vitals [01/28/20 0528]   Temperature Pulse Respirations Blood Pressure SpO2   98 1 °F (36 7 °C) 83 18 131/69 97 %      Temp Source Heart Rate Source Patient Position - Orthostatic VS BP Location FiO2 (%)   Oral Monitor -- Right arm --      Pain Score       6           Vitals:    01/28/20 0528   BP: 131/69   Pulse: 83         Visual Acuity      ED Medications  Medications   sodium chloride 0 9 % bolus 1,000 mL (1,000 mL Intravenous New Bag 1/28/20 0551)   dicyclomine (BENTYL) tablet 20 mg (has no administration in time range)   ketorolac (TORADOL) injection 30 mg (30 mg Intravenous Given 1/28/20 0602)   ondansetron (ZOFRAN) injection 4 mg (4 mg Intravenous Given 1/28/20 0601)       Diagnostic Studies  Results Reviewed     Procedure Component Value Units Date/Time    Comprehensive metabolic panel [[id number]]  (Abnormal) Collected:  01/28/20 0551    Lab Status:  Final result Specimen:  Blood from Arm, Right Updated:  01/28/20 0625     Sodium 141 mmol/L      Potassium 4 0 mmol/L      Chloride 104 mmol/L      CO2 28 mmol/L      ANION GAP 9 mmol/L      BUN 12 mg/dL      Creatinine 0 71 mg/dL      Glucose 99 mg/dL      Calcium 9 0 mg/dL      AST 8 U/L      ALT 19 U/L      Alkaline Phosphatase 74 U/L      Total Protein 7 7 g/dL      Albumin 4 0 g/dL      Total Bilirubin 0 18 mg/dL      eGFR 119 ml/min/1 73sq m     Narrative:       [place] guidelines for Chronic Kidney Disease (CKD):     Stage 1 with normal or high GFR (GFR > 90 mL/min/1 73 square meters)    Stage 2 Mild CKD (GFR = 60-89 mL/min/1 73 square meters)    Stage 3A Moderate CKD (GFR = 45-59 mL/min/1 73 square meters)    Stage 3B Moderate CKD (GFR = 30-44 mL/min/1 73 square meters)    Stage 4 Severe CKD (GFR = 15-29 mL/min/1 73 square meters)    Stage 5 End Stage CKD (GFR <15 mL/min/1 73 square meters)  Note: GFR calculation is accurate only with a steady state creatinine    Lipase [[id number]]  (Normal) Collected:  01/28/20 0551    Lab Status:  Final result Specimen:  Blood from Arm, Right Updated:  01/28/20 0625     Lipase 130 u/L     CBC and differential [[id number]] Collected:  01/28/20 0551    Lab Status:  Final result Specimen:  Blood from Arm, Right Updated:  01/28/20 0605     WBC 8 06 Thousand/uL      RBC 4 87 Million/uL      Hemoglobin 13 8 g/dL      Hematocrit 42 7 %      MCV 88 fL      MCH 28 3 pg      MCHC 32 3 g/dL      RDW 13 8 %      MPV 11 0 fL      Platelets 569 Thousands/uL      nRBC 0 /100 WBCs      Neutrophils Relative 72 %      Immat GRANS % 0 %      Lymphocytes Relative 20 %      Monocytes Relative 7 %      Eosinophils Relative 1 %      Basophils Relative 0 %      Neutrophils Absolute 5 74 Thousands/µL      Immature Grans Absolute 0 03 Thousand/uL      Lymphocytes Absolute 1 61 Thousands/µL      Monocytes Absolute 0 56 Thousand/µL      Eosinophils Absolute 0 09 Thousand/µL      Basophils Absolute 0 03 Thousands/µL     Urine Microscopic [[id number]] Collected:  01/28/20 0543    Lab Status:   In process Specimen:  Urine, Clean Catch Updated:  01/28/20 0553    POCT pregnancy, urine [[id number]]  (Normal) Resulted:  01/28/20 0550    Lab Status:  Final result Updated:  01/28/20 0550     EXT PREG TEST UR (Ref: Negative) Negative     Control Valid    POCT urinalysis dipstick [[id number]]  (Abnormal) Resulted:  01/28/20 0550    Lab Status:  Final result Specimen:  Urine Updated:  01/28/20 0550    Urine Macroscopic, POC [[id number]]  (Abnormal) Collected:  01/28/20 0543    Lab Status:  Final result Specimen:  Urine Updated:  01/28/20 0545     Color, UA Yellow     Clarity, UA Clear     pH, UA 6 0     Leukocytes, UA Small     Nitrite, UA Negative     Protein, UA 30 (1+) mg/dl      Glucose, UA Negative mg/dl      Ketones, UA Negative mg/dl      Urobilinogen, UA 0 2 E U /dl      Bilirubin, UA Negative     Blood, UA Negative     Specific Gravity, UA 1 025    Narrative:       CLINITEK RESULT                 No orders to display              Procedures  Procedures         ED Course  ED Course as of Jan 28 0634   Tue Jan 28, 2020   0540 Pt presenting with abdominal pain, n/v since this morning  Based on history and exam, appears to be viral gastroenteritis related as her pain is generalized  She works at a  with ill contacts  I offered blood work and IV medications which she will take  Acute abdomen is not suspected at this time  5398 Blood work is negative  Will reassess the pt       [id number] Pt feeling better after treatment  Sleeping on reassessment  Educated on return precautions  Stable for discharge  MDM      Disposition  Final diagnoses:   Generalized abdominal pain   Non-intractable vomiting with nausea, unspecified vomiting type     Time reflects when diagnosis was documented in both MDM as applicable and the Disposition within this note     Time User Action Codes Description Comment    1/28/2020  6:32 AM Malgorzata Hopping B Add [R10 84] Generalized abdominal pain     1/28/2020  6:33 AM Malgorzata Hopping B Add [R11 2] Non-intractable vomiting with nausea, unspecified vomiting type       ED Disposition     ED Disposition Condition Date/Time Comment    Discharge Good Tue Jan 28, 2020  6:32 AM Yvette Theodore discharge to home/self care              Follow-up Information     Follow up With Specialties Details Why Contact Info Additional [address] Schedule an appointment as soon as possible for a visit today  [address], [address] [facility] [address] [facility], [address], [address] [place], [place], [address]          Patient's Medications   Discharge Prescriptions    DICYCLOMINE (BENTYL) 20 MG TABLET    Take 1 tablet (20 mg total) by mouth 2 (two) times a day       Start Date: 1/28/2020 End Date: --       Order Dose: 20 mg       Quantity: 20 tablet    Refills: 0    ONDANSETRON (ZOFRAN-ODT) 4 MG DISINTEGRATING TABLET    Take 1 tablet (4 mg total) by mouth every 8 (eight) hours as needed for nausea       Start Date: 1/28/2020 End Date: --       Order Dose: 4 mg       Quantity: 20 tablet    Refills: 0     No discharge procedures on file      ED Provider  Electronically Signed by           Debora Vega PA-C  01/28/20 0962

## 2020-01-28 NOTE — ED NOTES
Pt's pain improved, requested to be discharged prior to IV fluids finishing  Provider made aware       Von Jones, RN  01/28/20 3413

## 2020-01-29 ENCOUNTER — HOSPITAL ENCOUNTER (EMERGENCY)
Facility: HOSPITAL | Age: 25
Discharge: HOME | End: 2020-01-29
Attending: EMERGENCY MEDICINE
Payer: COMMERCIAL

## 2020-01-29 ENCOUNTER — APPOINTMENT (EMERGENCY)
Dept: RADIOLOGY | Facility: HOSPITAL | Age: 25
End: 2020-01-29
Payer: COMMERCIAL

## 2020-01-29 VITALS
TEMPERATURE: 98 F | BODY MASS INDEX: 37.33 KG/M2 | RESPIRATION RATE: 16 BRPM | SYSTOLIC BLOOD PRESSURE: 115 MMHG | HEART RATE: 77 BPM | DIASTOLIC BLOOD PRESSURE: 64 MMHG | OXYGEN SATURATION: 99 % | WEIGHT: 214.07 LBS

## 2020-01-29 DIAGNOSIS — K52.9 GASTROENTERITIS: Primary | ICD-10-CM

## 2020-01-29 LAB
ALBUMIN SERPL BCP-MCNC: 3.6 G/DL (ref 3.5–5)
ALP SERPL-CCNC: 64 U/L (ref 46–116)
ALT SERPL W P-5'-P-CCNC: 18 U/L (ref 12–78)
AMORPH PHOS CRY URNS QL MICRO: ABNORMAL /HPF
ANION GAP SERPL CALCULATED.3IONS-SCNC: 7 MMOL/L (ref 4–13)
AST SERPL W P-5'-P-CCNC: 7 U/L (ref 5–45)
ATRIAL RATE: 77 BPM
BACTERIA UR QL AUTO: ABNORMAL /HPF
BASOPHILS # BLD AUTO: 0.01 THOUSANDS/ΜL (ref 0–0.1)
BASOPHILS NFR BLD AUTO: 0 % (ref 0–1)
BILIRUB SERPL-MCNC: 0.3 MG/DL (ref 0.2–1)
BILIRUB UR QL STRIP: NEGATIVE
BUN SERPL-MCNC: 10 MG/DL (ref 5–25)
CALCIUM SERPL-MCNC: 8.4 MG/DL (ref 8.3–10.1)
CHLORIDE SERPL-SCNC: 104 MMOL/L (ref 100–108)
CLARITY UR: CLEAR
CO2 SERPL-SCNC: 30 MMOL/L (ref 21–32)
COLOR UR: YELLOW
CREAT SERPL-MCNC: 0.74 MG/DL (ref 0.6–1.3)
EOSINOPHIL # BLD AUTO: 0.1 THOUSAND/ΜL (ref 0–0.61)
EOSINOPHIL NFR BLD AUTO: 2 % (ref 0–6)
ERYTHROCYTE [DISTWIDTH] IN BLOOD BY AUTOMATED COUNT: 14.1 % (ref 11.6–15.1)
EXT PREG TEST URINE: NEGATIVE
EXT. CONTROL ED NAV: NORMAL
GFR SERPL CREATININE-BSD FRML MDRD: 113 ML/MIN/1.73SQ M
GLUCOSE SERPL-MCNC: 80 MG/DL (ref 65–140)
GLUCOSE UR STRIP-MCNC: NEGATIVE MG/DL
HCT VFR BLD AUTO: 39.4 % (ref 34.8–46.1)
HGB BLD-MCNC: 12.5 G/DL (ref 11.5–15.4)
HGB UR QL STRIP.AUTO: NEGATIVE
IMM GRANULOCYTES # BLD AUTO: 0.01 THOUSAND/UL (ref 0–0.2)
IMM GRANULOCYTES NFR BLD AUTO: 0 % (ref 0–2)
KETONES UR STRIP-MCNC: NEGATIVE MG/DL
LEUKOCYTE ESTERASE UR QL STRIP: ABNORMAL
LIPASE SERPL-CCNC: 92 U/L (ref 73–393)
LYMPHOCYTES # BLD AUTO: 1.36 THOUSANDS/ΜL (ref 0.6–4.47)
LYMPHOCYTES NFR BLD AUTO: 30 % (ref 14–44)
MCH RBC QN AUTO: 28.2 PG (ref 26.8–34.3)
MCHC RBC AUTO-ENTMCNC: 31.7 G/DL (ref 31.4–37.4)
MCV RBC AUTO: 89 FL (ref 82–98)
MONOCYTES # BLD AUTO: 0.5 THOUSAND/ΜL (ref 0.17–1.22)
MONOCYTES NFR BLD AUTO: 11 % (ref 4–12)
NEUTROPHILS # BLD AUTO: 2.54 THOUSANDS/ΜL (ref 1.85–7.62)
NEUTS SEG NFR BLD AUTO: 57 % (ref 43–75)
NITRITE UR QL STRIP: NEGATIVE
NON-SQ EPI CELLS URNS QL MICRO: ABNORMAL /HPF
NRBC BLD AUTO-RTO: 0 /100 WBCS
P AXIS: 10 DEGREES
PH UR STRIP.AUTO: 7 [PH] (ref 4.5–8)
PLATELET # BLD AUTO: 186 THOUSANDS/UL (ref 149–390)
PMV BLD AUTO: 10.7 FL (ref 8.9–12.7)
POTASSIUM SERPL-SCNC: 3.4 MMOL/L (ref 3.5–5.3)
PR INTERVAL: 148 MS
PROT SERPL-MCNC: 7.1 G/DL (ref 6.4–8.2)
PROT UR STRIP-MCNC: NEGATIVE MG/DL
QRS AXIS: 37 DEGREES
QRSD INTERVAL: 82 MS
QT INTERVAL: 370 MS
QTC INTERVAL: 418 MS
RBC # BLD AUTO: 4.44 MILLION/UL (ref 3.81–5.12)
RBC #/AREA URNS AUTO: ABNORMAL /HPF
SODIUM SERPL-SCNC: 141 MMOL/L (ref 136–145)
SP GR UR STRIP.AUTO: 1.02 (ref 1–1.03)
T WAVE AXIS: -2 DEGREES
UROBILINOGEN UR QL STRIP.AUTO: 1 E.U./DL
VENTRICULAR RATE: 77 BPM
WBC # BLD AUTO: 4.52 THOUSAND/UL (ref 4.31–10.16)
WBC #/AREA URNS AUTO: ABNORMAL /HPF

## 2020-01-29 PROCEDURE — 96374 THER/PROPH/DIAG INJ IV PUSH: CPT

## 2020-01-29 PROCEDURE — 85025 COMPLETE CBC W/AUTO DIFF WBC: CPT | Performed by: EMERGENCY MEDICINE

## 2020-01-29 PROCEDURE — 93010 ELECTROCARDIOGRAM REPORT: CPT | Performed by: INTERNAL MEDICINE

## 2020-01-29 PROCEDURE — 83690 ASSAY OF LIPASE: CPT | Performed by: EMERGENCY MEDICINE

## 2020-01-29 PROCEDURE — 81025 URINE PREGNANCY TEST: CPT | Performed by: EMERGENCY MEDICINE

## 2020-01-29 PROCEDURE — 80053 COMPREHEN METABOLIC PANEL: CPT | Performed by: EMERGENCY MEDICINE

## 2020-01-29 PROCEDURE — 99285 EMERGENCY DEPT VISIT HI MDM: CPT | Performed by: EMERGENCY MEDICINE

## 2020-01-29 PROCEDURE — 71046 X-RAY EXAM CHEST 2 VIEWS: CPT

## 2020-01-29 PROCEDURE — 81001 URINALYSIS AUTO W/SCOPE: CPT

## 2020-01-29 PROCEDURE — 36415 COLL VENOUS BLD VENIPUNCTURE: CPT | Performed by: EMERGENCY MEDICINE

## 2020-01-29 PROCEDURE — 99284 EMERGENCY DEPT VISIT MOD MDM: CPT

## 2020-01-29 PROCEDURE — 96375 TX/PRO/DX INJ NEW DRUG ADDON: CPT

## 2020-01-29 PROCEDURE — 93005 ELECTROCARDIOGRAM TRACING: CPT

## 2020-01-29 PROCEDURE — 96361 HYDRATE IV INFUSION ADD-ON: CPT

## 2020-01-29 RX ORDER — KETOROLAC TROMETHAMINE 30 MG/ML
30 INJECTION, SOLUTION INTRAMUSCULAR; INTRAVENOUS ONCE
Status: COMPLETED | OUTPATIENT
Start: 2020-01-29 | End: 2020-01-29

## 2020-01-29 RX ORDER — METOCLOPRAMIDE HYDROCHLORIDE 5 MG/ML
10 INJECTION INTRAMUSCULAR; INTRAVENOUS ONCE
Status: COMPLETED | OUTPATIENT
Start: 2020-01-29 | End: 2020-01-29

## 2020-01-29 RX ADMIN — SODIUM CHLORIDE 1000 ML: 0.9 INJECTION, SOLUTION INTRAVENOUS at 17:27

## 2020-01-29 RX ADMIN — METOCLOPRAMIDE 10 MG: 5 INJECTION, SOLUTION INTRAMUSCULAR; INTRAVENOUS at 17:29

## 2020-01-29 RX ADMIN — KETOROLAC TROMETHAMINE 30 MG: 30 INJECTION, SOLUTION INTRAMUSCULAR at 17:34

## 2020-01-29 NOTE — ED PROVIDER NOTES
History  Chief Complaint   Patient presents with    Vomiting     pt evaluated 2 days ago for similar sx   states continues to feel nauseous and vomiting   states unable to have a BM and pain into chest and abdomen  58-year-old female with a history of asthma presents to the emergency department with ongoing symptoms of nausea and vomiting  Patient was seen here 2 days ago for these symptoms and had blood work and a urinalysis and urine pregnancy test which were all within normal limits  She was discharged home with Bentyl and Zofran  She states she is taking these medications but still has nausea and vomiting  She now complains of sharp pain in her chest radiating to her back  She also complains of a generalized headache  She states she can't keep anything down but then states that she has been taking Tylenol and Motrin without relief  No fevers  No lower abdominal pain  History provided by:  Patient   used: No    Vomiting   Severity:  Unable to specify  Duration:  2 days  Timing:  Intermittent  Quality:  Stomach contents  Progression:  Unchanged  Chronicity:  New  Relieved by:  Nothing  Worsened by:  Nothing  Ineffective treatments:  Antiemetics and liquids  Associated symptoms: headaches    Associated symptoms: no abdominal pain, no arthralgias, no chills, no cough, no diarrhea, no fever, no myalgias, no sore throat and no URI    Risk factors: no alcohol use, not pregnant, no sick contacts, no suspect food intake and no travel to endemic areas        Prior to Admission Medications   Prescriptions Last Dose Informant Patient Reported? Taking?    albuterol (PROVENTIL HFA,VENTOLIN HFA) 90 mcg/act inhaler   Yes No   Sig: Inhale 1 puff every 6 (six) hours as needed   dicyclomine (BENTYL) 20 mg tablet   No No   Sig: Take 1 tablet (20 mg total) by mouth 2 (two) times a day   ibuprofen (MOTRIN) 600 mg tablet   Yes No   Sig: Take 600 mg by mouth every 6 (six) hours as needed ondansetron (ZOFRAN-ODT) 4 mg disintegrating tablet   No No   Sig: Take 1 tablet (4 mg total) by mouth every 8 (eight) hours as needed for nausea      Facility-Administered Medications: None       History reviewed  No pertinent past medical history  Past Surgical History:   Procedure Laterality Date     SECTION      TOOTH EXTRACTION      TUBAL LIGATION         History reviewed  No pertinent family history  I have reviewed and agree with the history as documented  Social History     Tobacco Use    Smoking status: Current Some Day Smoker     Packs/day: 0 20    Smokeless tobacco: Never Used   Substance Use Topics    Alcohol use: Not Currently    Drug use: Never        Review of Systems   Constitutional: Negative  Negative for chills, diaphoresis, fatigue and fever  HENT: Negative  Negative for congestion, rhinorrhea and sore throat  Eyes: Negative  Negative for discharge, redness and itching  Respiratory: Negative  Negative for apnea, cough, chest tightness, shortness of breath and wheezing  Cardiovascular: Negative for chest pain, palpitations and leg swelling  Gastrointestinal: Positive for vomiting  Negative for abdominal pain and diarrhea  Endocrine: Negative  Genitourinary: Negative  Negative for flank pain, frequency and urgency  Musculoskeletal: Negative  Negative for arthralgias, back pain and myalgias  Skin: Negative  Allergic/Immunologic: Negative  Neurological: Positive for headaches  Negative for dizziness, syncope, weakness, light-headedness and numbness  Hematological: Negative  All other systems reviewed and are negative  Physical Exam  Physical Exam   Constitutional: She is oriented to person, place, and time  She appears well-developed and well-nourished  Non-toxic appearance  She does not have a sickly appearance  She does not appear ill  No distress  HENT:   Head: Normocephalic and atraumatic     Right Ear: External ear normal  Left Ear: External ear normal    Mouth/Throat: Oropharynx is clear and moist    Eyes: Pupils are equal, round, and reactive to light  Conjunctivae are normal  Right eye exhibits no discharge  Left eye exhibits no discharge  No scleral icterus  Cardiovascular: Normal rate, regular rhythm and normal heart sounds  Exam reveals no gallop and no friction rub  No murmur heard  Pulmonary/Chest: Effort normal and breath sounds normal  No stridor  No respiratory distress  She has no wheezes  She has no rales  She exhibits no tenderness  Abdominal: Soft  Bowel sounds are normal  She exhibits no distension and no mass  There is no tenderness  No hernia  Neurological: She is alert and oriented to person, place, and time  She has normal reflexes  She exhibits normal muscle tone  Skin: Skin is warm and dry  No rash noted  She is not diaphoretic  No erythema  No pallor  Psychiatric: She has a normal mood and affect  Nursing note and vitals reviewed        Vital Signs  ED Triage Vitals [01/29/20 1630]   Temperature Pulse Respirations Blood Pressure SpO2   98 °F (36 7 °C) 77 16 115/64 99 %      Temp Source Heart Rate Source Patient Position - Orthostatic VS BP Location FiO2 (%)   Temporal -- -- -- --      Pain Score       6           Vitals:    01/29/20 1630   BP: 115/64   Pulse: 77         Visual Acuity      ED Medications  Medications   sodium chloride 0 9 % bolus 1,000 mL (has no administration in time range)   ketorolac (TORADOL) injection 30 mg (has no administration in time range)   metoclopramide (REGLAN) injection 10 mg (has no administration in time range)       Diagnostic Studies  Results Reviewed     Procedure Component Value Units Date/Time    CBC and differential [[id number]]     Lab Status:  No result Specimen:  Blood     Comprehensive metabolic panel [[id number]]     Lab Status:  No result Specimen:  Blood     Lipase [[id number]]     Lab Status:  No result Specimen:  Blood     POCT pregnancy, urine [[id number]]     Lab Status:  No result     POCT urinalysis dipstick [[id number]]     Lab Status:  No result Specimen:  Urine                  XR chest 2 views    (Results Pending)              Procedures  ECG 12 Lead Documentation Only  Date/Time: 1/29/2020 5:46 PM  Performed by: Dae Rey DO  Authorized by: Dae Rey DO     Indications / Diagnosis:  Cp  ECG reviewed by me, the ED Provider: yes    Patient location:  ED  Interpretation:     Interpretation: normal    Rate:     ECG rate assessment: normal    Rhythm:     Rhythm: sinus rhythm    Ectopy:     Ectopy: none    Conduction:     Conduction: normal    ST segments:     ST segments:  Normal  T waves:     T waves: normal               ED Course                               MDM  Number of Diagnoses or Management Options  Diagnosis management comments: 54-year-old female presents with ongoing symptoms of nausea and vomiting  She was evaluated for these symptoms 2 days ago and was given Zofran and Bentyl without any relief  She now states she has sharp pain in her chest radiating to her back and also a diffuse headache and relieved with Tylenol or Motrin  She has had no fevers  On exam she is alert no acute distress  She did have 1 episode of dry heaving while I was in the room  Her exam is otherwise normal   Will repeat blood work, CBC, CMP, lipase  Will also do EKG and chest x-ray  Will give Toradol, Reglan and IV fluids  Will reassess  Amount and/or Complexity of Data Reviewed  Clinical lab tests: ordered and reviewed  Tests in the radiology section of CPT®: ordered and reviewed  Review and summarize past medical records: yes  Independent visualization of images, tracings, or specimens: yes          Disposition  Final diagnoses:   None     ED Disposition     None      Follow-up Information    None         Patient's Medications   Discharge Prescriptions    No medications on file     No discharge procedures on file      ED Provider  Electronically Signed by           Madison Day DO  01/29/20 3365

## 2020-01-29 NOTE — ED NOTES
Pt rang call bell requesting for IV to be removed at this time  Pt states her children are making her headache worse and feels she cannot get better with them here  She explained she would take the children home and possibly come back later to be seen again  Provider, primary nurse, and charge nurse made aware  Pt's IV removed and left without discharge instructions       Joan Sommer  01/29/20 6345

## 2021-03-25 ENCOUNTER — APPOINTMENT (EMERGENCY)
Dept: CT IMAGING | Facility: HOSPITAL | Age: 26
End: 2021-03-25
Payer: COMMERCIAL

## 2021-03-25 ENCOUNTER — HOSPITAL ENCOUNTER (EMERGENCY)
Facility: HOSPITAL | Age: 26
Discharge: HOME | End: 2021-03-25
Attending: EMERGENCY MEDICINE | Admitting: EMERGENCY MEDICINE
Payer: COMMERCIAL

## 2021-03-25 VITALS
BODY MASS INDEX: 41.33 KG/M2 | WEIGHT: 233.25 LBS | SYSTOLIC BLOOD PRESSURE: 123 MMHG | HEIGHT: 63 IN | OXYGEN SATURATION: 97 % | HEART RATE: 70 BPM | RESPIRATION RATE: 18 BRPM | DIASTOLIC BLOOD PRESSURE: 78 MMHG | TEMPERATURE: 99.5 F

## 2021-03-25 DIAGNOSIS — K52.9 GASTROENTERITIS: Primary | ICD-10-CM

## 2021-03-25 LAB
ALBUMIN SERPL BCP-MCNC: 4.5 G/DL (ref 3–5.2)
ALP SERPL-CCNC: 57 U/L (ref 43–122)
ALT SERPL W P-5'-P-CCNC: 27 U/L
ANION GAP SERPL CALCULATED.3IONS-SCNC: 7 MMOL/L (ref 5–14)
AST SERPL W P-5'-P-CCNC: 31 U/L (ref 14–36)
BACTERIA UR QL AUTO: ABNORMAL /HPF
BASOPHILS # BLD AUTO: 0 THOUSANDS/ΜL (ref 0–0.1)
BASOPHILS NFR BLD AUTO: 1 % (ref 0–1)
BILIRUB SERPL-MCNC: 0.7 MG/DL
BILIRUB UR QL STRIP: NEGATIVE
BUN SERPL-MCNC: 11 MG/DL (ref 5–25)
CALCIUM SERPL-MCNC: 9.2 MG/DL (ref 8.4–10.2)
CHLORIDE SERPL-SCNC: 105 MMOL/L (ref 97–108)
CLARITY UR: CLEAR
CO2 SERPL-SCNC: 25 MMOL/L (ref 22–30)
COLOR UR: YELLOW
CREAT SERPL-MCNC: 0.67 MG/DL (ref 0.6–1.2)
EOSINOPHIL # BLD AUTO: 0.5 THOUSAND/ΜL (ref 0–0.4)
EOSINOPHIL NFR BLD AUTO: 6 % (ref 0–6)
ERYTHROCYTE [DISTWIDTH] IN BLOOD BY AUTOMATED COUNT: 14.1 %
EXT PREG TEST URINE: NEGATIVE
EXT. CONTROL ED NAV: NORMAL
GFR SERPL CREATININE-BSD FRML MDRD: 122 ML/MIN/1.73SQ M
GLUCOSE SERPL-MCNC: 105 MG/DL (ref 70–99)
GLUCOSE UR STRIP-MCNC: NEGATIVE MG/DL
HCT VFR BLD AUTO: 38.6 % (ref 36–46)
HGB BLD-MCNC: 13.1 G/DL (ref 12–16)
HGB UR QL STRIP.AUTO: NEGATIVE
KETONES UR STRIP-MCNC: NEGATIVE MG/DL
LEUKOCYTE ESTERASE UR QL STRIP: 25
LIPASE SERPL-CCNC: 52 U/L (ref 23–300)
LYMPHOCYTES # BLD AUTO: 2.5 THOUSANDS/ΜL (ref 0.5–4)
LYMPHOCYTES NFR BLD AUTO: 29 % (ref 25–45)
MCH RBC QN AUTO: 28.8 PG (ref 26–34)
MCHC RBC AUTO-ENTMCNC: 34 G/DL (ref 31–36)
MCV RBC AUTO: 85 FL (ref 80–100)
MONOCYTES # BLD AUTO: 0.5 THOUSAND/ΜL (ref 0.2–0.9)
MONOCYTES NFR BLD AUTO: 5 % (ref 1–10)
NEUTROPHILS # BLD AUTO: 5.1 THOUSANDS/ΜL (ref 1.8–7.8)
NEUTS SEG NFR BLD AUTO: 59 % (ref 45–65)
NITRITE UR QL STRIP: NEGATIVE
NON-SQ EPI CELLS URNS QL MICRO: ABNORMAL /HPF
PH UR STRIP.AUTO: 6 [PH]
PLATELET # BLD AUTO: 224 THOUSANDS/UL (ref 150–450)
PMV BLD AUTO: 8.9 FL (ref 8.9–12.7)
POTASSIUM SERPL-SCNC: 4.2 MMOL/L (ref 3.6–5)
PROT SERPL-MCNC: 7.7 G/DL (ref 5.9–8.4)
PROT UR STRIP-MCNC: NEGATIVE MG/DL
RBC # BLD AUTO: 4.56 MILLION/UL (ref 4–5.2)
RBC #/AREA URNS AUTO: ABNORMAL /HPF
SODIUM SERPL-SCNC: 137 MMOL/L (ref 137–147)
SP GR UR STRIP.AUTO: 1.02 (ref 1–1.04)
UROBILINOGEN UA: NEGATIVE MG/DL
WBC # BLD AUTO: 8.6 THOUSAND/UL (ref 4.5–11)
WBC #/AREA URNS AUTO: ABNORMAL /HPF

## 2021-03-25 PROCEDURE — 81001 URINALYSIS AUTO W/SCOPE: CPT | Performed by: PHYSICIAN ASSISTANT

## 2021-03-25 PROCEDURE — 99284 EMERGENCY DEPT VISIT MOD MDM: CPT

## 2021-03-25 PROCEDURE — 96361 HYDRATE IV INFUSION ADD-ON: CPT

## 2021-03-25 PROCEDURE — 96375 TX/PRO/DX INJ NEW DRUG ADDON: CPT

## 2021-03-25 PROCEDURE — 81025 URINE PREGNANCY TEST: CPT | Performed by: PHYSICIAN ASSISTANT

## 2021-03-25 PROCEDURE — 85025 COMPLETE CBC W/AUTO DIFF WBC: CPT | Performed by: PHYSICIAN ASSISTANT

## 2021-03-25 PROCEDURE — 99284 EMERGENCY DEPT VISIT MOD MDM: CPT | Performed by: PHYSICIAN ASSISTANT

## 2021-03-25 PROCEDURE — 74177 CT ABD & PELVIS W/CONTRAST: CPT

## 2021-03-25 PROCEDURE — 96374 THER/PROPH/DIAG INJ IV PUSH: CPT

## 2021-03-25 PROCEDURE — 36415 COLL VENOUS BLD VENIPUNCTURE: CPT | Performed by: PHYSICIAN ASSISTANT

## 2021-03-25 PROCEDURE — 80053 COMPREHEN METABOLIC PANEL: CPT | Performed by: PHYSICIAN ASSISTANT

## 2021-03-25 PROCEDURE — 83690 ASSAY OF LIPASE: CPT | Performed by: PHYSICIAN ASSISTANT

## 2021-03-25 RX ORDER — ONDANSETRON 2 MG/ML
4 INJECTION INTRAMUSCULAR; INTRAVENOUS ONCE
Status: COMPLETED | OUTPATIENT
Start: 2021-03-25 | End: 2021-03-25

## 2021-03-25 RX ORDER — KETOROLAC TROMETHAMINE 30 MG/ML
15 INJECTION, SOLUTION INTRAMUSCULAR; INTRAVENOUS ONCE
Status: COMPLETED | OUTPATIENT
Start: 2021-03-25 | End: 2021-03-25

## 2021-03-25 RX ORDER — ONDANSETRON 4 MG/1
4 TABLET, FILM COATED ORAL EVERY 6 HOURS
Qty: 9 TABLET | Refills: 0 | Status: SHIPPED | OUTPATIENT
Start: 2021-03-25

## 2021-03-25 RX ADMIN — IOHEXOL 100 ML: 350 INJECTION, SOLUTION INTRAVENOUS at 21:47

## 2021-03-25 RX ADMIN — ONDANSETRON 4 MG: 2 INJECTION INTRAMUSCULAR; INTRAVENOUS at 21:02

## 2021-03-25 RX ADMIN — KETOROLAC TROMETHAMINE 15 MG: 30 INJECTION, SOLUTION INTRAMUSCULAR at 21:08

## 2021-03-25 RX ADMIN — SODIUM CHLORIDE 1000 ML: 0.9 INJECTION, SOLUTION INTRAVENOUS at 20:57

## 2021-03-25 RX ADMIN — FAMOTIDINE 20 MG: 10 INJECTION INTRAVENOUS at 21:05

## 2021-03-26 NOTE — ED NOTES
Patient reports that she is feeling better  Discharge instructions and results of testing were discussed with patient by Tarsha Craig RN  03/25/21 6790

## 2021-03-26 NOTE — ED PROVIDER NOTES
History  Chief Complaint   Patient presents with    Abdominal Pain     Pain started 20 minutes ago, between RUQ & RLQ and it goes to the back, did not take anything for pain   Diarrhea     Diarrhea times 3 today and nausea  29-year-old female without significant past medical history presents complaining of 30 minutes of right lower quadrant abdominal pain  Patient tells me that she was feeling crampy all day and thought she was just getting her  However is not due for her period and pain became worse  Pain started at the valuable and has since moved to the right lower quadrant  Also admits to nausea without vomiting as well as anorexia and 3 episodes of diarrhea  Denies history of similar  Has not taken anything prior to arrival   Denies recent fever, illness, travel, sick contacts or antibiotic use  Denies any other complaints  History provided by:  Patient   used: No        Prior to Admission Medications   Prescriptions Last Dose Informant Patient Reported? Taking? albuterol (PROVENTIL HFA,VENTOLIN HFA) 90 mcg/act inhaler Not Taking at Unknown time  Yes No   Sig: Inhale 1 puff every 6 (six) hours as needed   dicyclomine (BENTYL) 20 mg tablet Not Taking at Unknown time  No No   Sig: Take 1 tablet (20 mg total) by mouth 2 (two) times a day   Patient not taking: Reported on 3/25/2021   ibuprofen (MOTRIN) 600 mg tablet Not Taking at Unknown time  Yes No   Sig: Take 600 mg by mouth every 6 (six) hours as needed   ondansetron (ZOFRAN-ODT) 4 mg disintegrating tablet Not Taking at Unknown time  No No   Sig: Take 1 tablet (4 mg total) by mouth every 8 (eight) hours as needed for nausea   Patient not taking: Reported on 3/25/2021      Facility-Administered Medications: None       History reviewed  No pertinent past medical history  Past Surgical History:   Procedure Laterality Date     SECTION      TOOTH EXTRACTION      TUBAL LIGATION         History reviewed   No pertinent family history  I have reviewed and agree with the history as documented  E-Cigarette/Vaping     E-Cigarette/Vaping Substances     Social History     Tobacco Use    Smoking status: Current Some Day Smoker     Packs/day: 0 20    Smokeless tobacco: Never Used   Substance Use Topics    Alcohol use: Not Currently    Drug use: Never       Review of Systems   Constitutional: Negative  Negative for chills and fatigue  HENT: Negative for ear pain and sore throat  Eyes: Negative for photophobia and redness  Respiratory: Negative for apnea, cough and shortness of breath  Cardiovascular: Negative for chest pain  Gastrointestinal: Positive for abdominal pain, diarrhea and nausea  Negative for vomiting  Genitourinary: Negative for dysuria  Musculoskeletal: Negative for arthralgias, neck pain and neck stiffness  Skin: Negative for rash  Neurological: Negative for dizziness, tremors, syncope and weakness  Psychiatric/Behavioral: Negative for suicidal ideas  Physical Exam  Physical Exam  Constitutional:       General: She is not in acute distress  Appearance: She is well-developed  She is not diaphoretic  Eyes:      Pupils: Pupils are equal, round, and reactive to light  Neck:      Musculoskeletal: Normal range of motion and neck supple  Cardiovascular:      Rate and Rhythm: Normal rate and regular rhythm  Pulmonary:      Effort: Pulmonary effort is normal  No respiratory distress  Breath sounds: Normal breath sounds  Abdominal:      General: Bowel sounds are normal  There is no distension  Palpations: Abdomen is soft  Comments: Soft nondistended tenderness with some guarding in the right lower quadrant no rebound tenderness no rigidity  Some tenderness along the periumbilical area  Negative CVA tenderness bilaterally  No peritoneal signs  Musculoskeletal: Normal range of motion  Skin:     General: Skin is warm and dry     Neurological:      Mental Status: She is alert and oriented to person, place, and time           Vital Signs  ED Triage Vitals [03/25/21 2028]   Temperature Pulse Respirations Blood Pressure SpO2   99 5 °F (37 5 °C) 88 16 145/79 97 %      Temp Source Heart Rate Source Patient Position - Orthostatic VS BP Location FiO2 (%)   Tympanic Monitor Sitting Left arm --      Pain Score       7           Vitals:    03/25/21 2028   BP: 145/79   Pulse: 88   Patient Position - Orthostatic VS: Sitting         Visual Acuity      ED Medications  Medications   ketorolac (TORADOL) injection 15 mg (15 mg Intravenous Given 3/25/21 2108)   famotidine (PEPCID) injection 20 mg (20 mg Intravenous Given 3/25/21 2105)   ondansetron (ZOFRAN) injection 4 mg (4 mg Intravenous Given 3/25/21 2102)   sodium chloride 0 9 % bolus 1,000 mL (1,000 mL Intravenous New Bag 3/25/21 2057)   iohexol (OMNIPAQUE) 350 MG/ML injection (SINGLE-DOSE) 100 mL (100 mL Intravenous Given 3/25/21 2147)       Diagnostic Studies  Results Reviewed     Procedure Component Value Units Date/Time    Urine Microscopic [[id number]]  (Abnormal) Collected: 03/25/21 2110    Lab Status: Final result Specimen: Urine, Clean Catch Updated: 03/25/21 2139     RBC, UA None Seen /hpf      WBC, UA 2-4 /hpf      Epithelial Cells Moderate /hpf      Bacteria, UA None Seen /hpf     UA (URINE) with reflex to Scope [[id number]]  (Abnormal) Collected: 03/25/21 2110    Lab Status: Final result Specimen: Urine, Clean Catch Updated: 03/25/21 2127     Color, UA Yellow     Clarity, UA Clear     Specific [place], UA 1 020     pH, UA 6 0     Leukocytes, UA 25 0     Nitrite, UA Negative     Protein, UA Negative mg/dl      Glucose, UA Negative mg/dl      Ketones, UA Negative mg/dl      Bilirubin, UA Negative     Blood, UA Negative     UROBILINOGEN UA Negative mg/dL     Lipase [[id number]]  (Normal) Collected: 03/25/21 2054    Lab Status: Final result Specimen: Blood from Arm, Right Updated: 03/25/21 2118     Lipase 52 u/L Narrative:      Hemolysis    Comprehensive metabolic panel [[id number]]  (Abnormal) Collected: 03/25/21 2054    Lab Status: Final result Specimen: Blood from Arm, Right Updated: 03/25/21 2118     Sodium 137 mmol/L      Potassium 4 2 mmol/L      Chloride 105 mmol/L      CO2 25 mmol/L      ANION GAP 7 mmol/L      BUN 11 mg/dL      Creatinine 0 67 mg/dL      Glucose 105 mg/dL      Calcium 9 2 mg/dL      AST 31 U/L      ALT 27 U/L      Alkaline Phosphatase 57 U/L      Total Protein 7 7 g/dL      Albumin 4 5 g/dL      Total Bilirubin 0 70 mg/dL      eGFR 122 ml/min/1 73sq m     Narrative:      Hemolysis  National Kid[employer] Disease Foundation guidelines for Chronic Kidney Disease (CKD):     Stage 1 with normal or high GFR (GFR > 90 mL/min/1 73 square meters)    Stage 2 Mild CKD (GFR = 60-89 mL/min/1 73 square meters)    Stage 3A Moderate CKD (GFR = 45-59 mL/min/1 73 square meters)    Stage 3B Moderate CKD (GFR = 30-44 mL/min/1 73 square meters)    Stage 4 Severe CKD (GFR = 15-29 mL/min/1 73 square meters)    Stage 5 End Stage CKD (GFR <15 mL/min/1 73 square meters)  Note: GFR calculation is accurate only with a steady state creatinine    CBC and differential [[id number]]  (Abnormal) Collected: 03/25/21 2054    Lab Status: Final result Specimen: Blood from Arm, Right Updated: 03/25/21 2109     WBC 8 60 Thousand/uL      RBC 4 56 Million/uL      Hemoglobin 13 1 g/dL      Hematocrit 38 6 %      MCV 85 fL      MCH 28 8 pg      MCHC 34 0 g/dL      RDW 14 1 %      MPV 8 9 fL      Platelets 318 Thousands/uL      Neutrophils Relative 59 %      Lymphocytes Relative 29 %      Monocytes Relative 5 %      Eosinophils Relative 6 %      Basophils Relative 1 %      Neutrophils Absolute 5 10 Thousands/µL      Lymphocytes Absolute 2 50 Thousands/µL      Monocytes Absolute 0 50 Thousand/µL      Eosinophils Absolute 0 50 Thousand/µL      Basophils Absolute 0 00 Thousands/µL     POCT pregnancy, urine [[id number]]  (Normal) Resulted: 03/25/21 2043    Lab Status: Final result Updated: 03/25/21 2043     EXT PREG TEST UR (Ref: Negative) Negative     Control Valid                 CT abdomen pelvis with contrast   Final Result by Sailaja Dyer MD (03/25 2252)      1  No acute findings in the abdomen or pelvis  2   Hepatomegaly and hepatic steatosis  Workstation performed: [id number]                    Procedures  Procedures         ED Course  ED Course as of Mar 25 2300   Thu Mar 25, 2021   2256 Pt states that she is feeling better and would like to go home                                 SBIRT 22yo+      Most Recent Value   SBIRT (25 yo +)   In order to provide better care to our patients, we are screening all of our patients for alcohol and drug use  Would it be okay to ask you these screening questions? Yes Filed at: 03/25/2021 2203   Initial Alcohol Screen: US AUDIT-C    1  How often do you have a drink containing alcohol?  0 Filed at: 03/25/2021 2203   2  How many drinks containing alcohol do you have on a typical day you are drinking? 0 Filed at: 03/25/2021 2203   3b  FEMALE Any Age, or MALE 65+: How often do you have 4 or more drinks on one occassion? 0 Filed at: 03/25/2021 2203   Audit-C Score  0 Filed at: 03/25/2021 2203   BILLIE: How many times in the past year have you    Used an illegal drug or used a prescription medication for non-medical reasons? Never Filed at: 03/25/2021 2203                    MDM  Number of Diagnoses or Management Options  Gastroenteritis: new and does not require workup  Diagnosis management comments: Benign evaluation emergency department  Patient felt better with medications given in ED  Symptoms thought to be related to gastroenteritis  Educated on supportive care and return precautions  Given primary care follow-up    Stable for discharge home       Amount and/or Complexity of Data Reviewed  Clinical lab tests: ordered and reviewed  Tests in the radiology section of CPT®: ordered and reviewed    Risk of Complications, Morbidity, and/or Mortality  Presenting problems: moderate  Diagnostic procedures: moderate  Management options: moderate    Patient Progress  Patient progress: stable      Disposition  Final diagnoses:   Gastroenteritis     Time reflects when diagnosis was documented in both MDM as applicable and the Disposition within this note     Time User Action Codes Description Comment    3/25/2021 10:58 PM Benjamin Tovar Add [K52 9] Gastroenteritis       ED Disposition     ED Disposition Condition Date/Time Comment    Discharge Stable Thu Mar 25, 2021 10:58 PM [address] Quach discharge to home/self care  Follow-up Information     Follow up With Specialties Details Why Contact Info Additional [address] [facility] Emergency Department Emergency Medicine Go to  If symptoms worsen [address] [phone] [address] Family Medicine Schedule an appointment as soon as possible for a visit  As needed [address], [id number] [place] [address], [address], [address] [place], [place], [address]          Patient's Medications   Discharge Prescriptions    ONDANSETRON (ZOFRAN) 4 MG TABLET    Take 1 tablet (4 mg total) by mouth every 6 (six) hours       Start Date: 3/25/2021 End Date: --       Order Dose: 4 mg       Quantity: 9 tablet    Refills: 0     No discharge procedures on file      PDMP Review     None          ED Provider  Electronically Signed by           Ivana Encarnacion PA-C  03/25/21 5550

## 2021-03-26 NOTE — DISCHARGE INSTRUCTIONS
Take medication as directed  Follow a bland diet  Avoid spicy greasy acidic foods  Return for worsening complaints    Follow up with primary care

## 2022-04-14 ENCOUNTER — HOSPITAL ENCOUNTER (EMERGENCY)
Facility: HOSPITAL | Age: 27
Discharge: HOME | End: 2022-04-14
Attending: EMERGENCY MEDICINE | Admitting: EMERGENCY MEDICINE
Payer: COMMERCIAL

## 2022-04-14 VITALS
BODY MASS INDEX: 39.82 KG/M2 | HEART RATE: 93 BPM | RESPIRATION RATE: 18 BRPM | DIASTOLIC BLOOD PRESSURE: 76 MMHG | TEMPERATURE: 99.6 F | SYSTOLIC BLOOD PRESSURE: 142 MMHG | OXYGEN SATURATION: 99 % | WEIGHT: 224.8 LBS

## 2022-04-14 DIAGNOSIS — R21 RASH AND NONSPECIFIC SKIN ERUPTION: Primary | ICD-10-CM

## 2022-04-14 PROCEDURE — 99284 EMERGENCY DEPT VISIT MOD MDM: CPT | Performed by: PHYSICIAN ASSISTANT

## 2022-04-14 PROCEDURE — 99282 EMERGENCY DEPT VISIT SF MDM: CPT

## 2022-04-14 RX ORDER — PREDNISONE 20 MG/1
50 TABLET ORAL DAILY
Qty: 15 TABLET | Refills: 0 | Status: SHIPPED | OUTPATIENT
Start: 2022-04-14 | End: 2022-04-20

## 2022-04-14 RX ORDER — CETIRIZINE HYDROCHLORIDE 10 MG/1
10 TABLET ORAL DAILY
Qty: 30 TABLET | Refills: 0 | Status: SHIPPED | OUTPATIENT
Start: 2022-04-14

## 2022-04-14 RX ORDER — LORATADINE 10 MG/1
10 TABLET ORAL DAILY
Status: DISCONTINUED | OUTPATIENT
Start: 2022-04-14 | End: 2022-04-14 | Stop reason: HOSPADM

## 2022-04-14 RX ADMIN — LORATADINE 10 MG: 10 TABLET ORAL at 14:34

## 2022-04-14 RX ADMIN — PREDNISONE 50 MG: 20 TABLET ORAL at 14:34

## 2022-04-14 NOTE — ED PROVIDER NOTES
History  Chief Complaint   Patient presents with    Rash     Rash on face that is spreading  Took Benadryl this morning without relief  Unsure what is causing  32 yo F presenting for evaluation rash  Patient states that she did recently come into her arms given mask by her  and at that time she started with  erythema, itching to lateral skin of L eye  Pt states she noticed the rash spreading down L cheek  She attempted to taken benadryl without relief before bed last night  She states she woke up this morning and had even more worsening of her facial rash that did not improve after another benadryl  Pt denies any sob, wheezing  She describes the rash as itching and burning  Prior to Admission Medications   Prescriptions Last Dose Informant Patient Reported? Taking? albuterol (PROVENTIL HFA,VENTOLIN HFA) 90 mcg/act inhaler   Yes No   Sig: Inhale 1 puff every 6 (six) hours as needed   dicyclomine (BENTYL) 20 mg tablet   No No   Sig: Take 1 tablet (20 mg total) by mouth 2 (two) times a day   Patient not taking: Reported on 3/25/2021   ibuprofen (MOTRIN) 600 mg tablet   Yes No   Sig: Take 600 mg by mouth every 6 (six) hours as needed   ondansetron (ZOFRAN) 4 mg tablet   No No   Sig: Take 1 tablet (4 mg total) by mouth every 6 (six) hours   ondansetron (ZOFRAN-ODT) 4 mg disintegrating tablet   No No   Sig: Take 1 tablet (4 mg total) by mouth every 8 (eight) hours as needed for nausea   Patient not taking: Reported on 3/25/2021      Facility-Administered Medications: None       History reviewed  No pertinent past medical history  Past Surgical History:   Procedure Laterality Date     SECTION      TOOTH EXTRACTION      TUBAL LIGATION         History reviewed  No pertinent family history  I have reviewed and agree with the history as documented      E-Cigarette/Vaping     E-Cigarette/Vaping Substances     Social History     Tobacco Use    Smoking status: Former Smoker     Packs/day: 0 20    Smokeless tobacco: Never Used   Substance Use Topics    Alcohol use: Not Currently    Drug use: Yes     Types: Marijuana       Review of Systems   All other systems reviewed and are negative  Physical Exam  Physical Exam  Vitals and nursing note reviewed  Constitutional:       General: She is not in acute distress  Appearance: Normal appearance  She is well-developed  HENT:      Head: Normocephalic and atraumatic  Eyes:      Conjunctiva/sclera: Conjunctivae normal       Comments: EOM grossly intact   Neck:      Vascular: No JVD  Cardiovascular:      Rate and Rhythm: Normal rate  Pulmonary:      Effort: Pulmonary effort is normal    Abdominal:      Palpations: Abdomen is soft  Musculoskeletal:      Cervical back: Normal range of motion and neck supple  Comments: FROM, steady gait, cap refill brisk, strength and sensation grossly intact throughout   Skin:     General: Skin is warm and dry  Capillary Refill: Capillary refill takes less than 2 seconds  Findings: Rash (erythematous macular rash to L periorbital region, R forehead, chin, no excoriations) present  Neurological:      Mental Status: She is alert and oriented to person, place, and time     Psychiatric:         Behavior: Behavior normal          Vital Signs  ED Triage Vitals [04/14/22 1348]   Temperature Pulse Respirations Blood Pressure SpO2   99 6 °F (37 6 °C) 93 18 142/76 99 %      Temp Source Heart Rate Source Patient Position - Orthostatic VS BP Location FiO2 (%)   Tympanic Monitor Sitting Left arm --      Pain Score       --           Vitals:    04/14/22 1348   BP: 142/76   Pulse: 93   Patient Position - Orthostatic VS: Sitting         Visual Acuity      ED Medications  Medications   predniSONE tablet 50 mg (has no administration in time range)   loratadine (CLARITIN) tablet 10 mg (has no administration in time range)       Diagnostic Studies  Results Reviewed     None                 No orders to display Procedures  Procedures         ED Course                               SBIRT 20yo+      Most Recent Value   SBIRT (24 yo +)    In order to provide better care to our patients, we are screening all of our patients for alcohol and drug use  Would it be okay to ask you these screening questions? No Filed at: 04/14/2022 1407                    [facility]  Number of Diagnoses or Management Options  Diagnosis management comments: 32 yo F presenting for evaluation of rash to the face, she denies any new lotions detergents or soaps  Reports the only new thing she did was use a  mask given to her by a uber , no wheezing or sob, advised continue benadryl, zyrtec, prednisone, cool compress    strict return to ED precautions discussed  Pt verbalizes understanding and agrees with plan  Pt is stable for discharge    Portions of the record may have been created with voice recognition software  Occasional wrong word or "sound a like" substitutions may have occurred due to the inherent limitations of voice recognition software  Read the chart carefully and recognize, using context, where substitutions have occurred  Disposition  Final diagnoses:   Rash and nonspecific skin eruption     Time reflects when diagnosis was documented in both MDM as applicable and the Disposition within this note     Time User Action Codes Description Comment    4/14/2022  2:29 PM Briana ERAZO Add [R21] Rash and nonspecific skin eruption       ED Disposition     ED Disposition Condition Date/Time Comment    Discharge Stable Thu Apr 14, 2022  2:29 PM [address] discharge to home/self care              Follow-up Information     Follow up With Specialties Details Why Contact Info Additional [address] [facility] Call in 1 day  [address], [address] [facility] [address]  [address], [address], [address], [place], [place] [address] [facility] Emergency Department Emergency Medicine Go to  If symptoms worsen [address] [address]  [facility] Heart Emergency Department          Patient's Medications   Discharge Prescriptions    CETIRIZINE (ZYRTEC) 10 MG TABLET    Take 1 tablet (10 mg total) by mouth daily       Start Date: 4/14/2022 End Date: --       Order Dose: 10 mg       Quantity: 30 tablet    Refills: 0    PREDNISONE 20 MG TABLET    Take 2 5 tablets (50 mg total) by mouth daily for 6 days       Start Date: 4/14/2022 End Date: 4/20/2022       Order Dose: 50 mg       Quantity: 15 tablet    Refills: 0       No discharge procedures on file      PDMP Review     None          ED Provider  Electronically Signed by           Melissa Littlejohn PA-C  04/14/22 5473

## 2022-10-05 ENCOUNTER — APPOINTMENT (OUTPATIENT)
Dept: RADIOLOGY | Facility: HOSPITAL | Age: 27
End: 2022-10-05
Payer: COMMERCIAL

## 2022-10-05 ENCOUNTER — HOSPITAL ENCOUNTER (EMERGENCY)
Facility: HOSPITAL | Age: 27
Discharge: HOME | End: 2022-10-05
Attending: STUDENT IN AN ORGANIZED HEALTH CARE EDUCATION/TRAINING PROGRAM
Payer: COMMERCIAL

## 2022-10-05 VITALS
TEMPERATURE: 98 F | RESPIRATION RATE: 18 BRPM | SYSTOLIC BLOOD PRESSURE: 133 MMHG | DIASTOLIC BLOOD PRESSURE: 77 MMHG | OXYGEN SATURATION: 99 % | HEART RATE: 78 BPM

## 2022-10-05 DIAGNOSIS — M54.42 ACUTE BILATERAL LOW BACK PAIN WITH BILATERAL SCIATICA: Primary | ICD-10-CM

## 2022-10-05 DIAGNOSIS — M54.41 ACUTE BILATERAL LOW BACK PAIN WITH BILATERAL SCIATICA: Primary | ICD-10-CM

## 2022-10-05 PROCEDURE — 96372 THER/PROPH/DIAG INJ SC/IM: CPT

## 2022-10-05 PROCEDURE — 99283 EMERGENCY DEPT VISIT LOW MDM: CPT

## 2022-10-05 PROCEDURE — 99284 EMERGENCY DEPT VISIT MOD MDM: CPT | Performed by: STUDENT IN AN ORGANIZED HEALTH CARE EDUCATION/TRAINING PROGRAM

## 2022-10-05 PROCEDURE — 72100 X-RAY EXAM L-S SPINE 2/3 VWS: CPT

## 2022-10-05 RX ORDER — METHYLPREDNISOLONE 4 MG/1
TABLET ORAL
Qty: 21 TABLET | Refills: 0 | Status: SHIPPED | OUTPATIENT
Start: 2022-10-05

## 2022-10-05 RX ORDER — NAPROXEN 500 MG/1
500 TABLET ORAL 2 TIMES DAILY WITH MEALS
Qty: 30 TABLET | Refills: 0 | Status: SHIPPED | OUTPATIENT
Start: 2022-10-05

## 2022-10-05 RX ORDER — LIDOCAINE 50 MG/G
1 PATCH TOPICAL ONCE
Status: DISCONTINUED | OUTPATIENT
Start: 2022-10-05 | End: 2022-10-05 | Stop reason: HOSPADM

## 2022-10-05 RX ORDER — CYCLOBENZAPRINE HCL 10 MG
10 TABLET ORAL 2 TIMES DAILY PRN
Qty: 20 TABLET | Refills: 0 | Status: SHIPPED | OUTPATIENT
Start: 2022-10-05

## 2022-10-05 RX ORDER — CYCLOBENZAPRINE HCL 10 MG
10 TABLET ORAL ONCE
Status: COMPLETED | OUTPATIENT
Start: 2022-10-05 | End: 2022-10-05

## 2022-10-05 RX ORDER — KETOROLAC TROMETHAMINE 30 MG/ML
30 INJECTION, SOLUTION INTRAMUSCULAR; INTRAVENOUS ONCE
Status: COMPLETED | OUTPATIENT
Start: 2022-10-05 | End: 2022-10-05

## 2022-10-05 RX ORDER — LIDOCAINE 50 MG/G
1 PATCH TOPICAL DAILY
Qty: 7 PATCH | Refills: 0 | Status: SHIPPED | OUTPATIENT
Start: 2022-10-05 | End: 2022-10-12

## 2022-10-05 RX ADMIN — CYCLOBENZAPRINE HYDROCHLORIDE 10 MG: 10 TABLET, FILM COATED ORAL at 18:52

## 2022-10-05 RX ADMIN — KETOROLAC TROMETHAMINE 30 MG: 30 INJECTION, SOLUTION INTRAMUSCULAR; INTRAVENOUS at 18:52

## 2022-10-05 RX ADMIN — LIDOCAINE 1 PATCH: 50 PATCH TOPICAL at 18:52

## 2022-10-05 NOTE — DISCHARGE INSTRUCTIONS
You were seen in the emergency department today for back pain  You received medications to help with her symptoms  You will be contacted with respect to your referral to the Spine program   Return to the emergency department if you develop difficulty urinating, fevers, difficulty walking, or your symptoms worsen  Otherwise, please follow-up with your primary care doctor in the next week  You are being sent with a prescription for several medications to help with her symptoms, these include a muscle relaxer (Flexeril), please do not drive while taking that medication as it can make you drowsy

## 2022-10-06 NOTE — ED PROVIDER NOTES
History  Chief Complaint   Patient presents with    Back Pain     Pt in distress c/o extreme, sudden onset lower back pain that radiates to lower extremities bilaterally  25-year-old female with no significant past medical history here for evaluation of back pain  Patient reports that earlier today, she started having some low back pain, laid down to take a nap and when she stood up felt sharp pain in her low back  States that since then, she has had a severe pain  She attempted to do a telemedicine visit, but they sent her to the ED for further evaluation given her level of pain  She states that it hurts when she walks or moves her legs  Feels like the pain is radiating into both legs  No fevers, no history of injuries, no recent illnesses  Reports that she has had issues with her back for several years, but that this is the worst she has ever felt today  Has not had any numbness or weakness in the legs or groin, no urinary retention, no bowel or bladder dysfunction  Prior to Admission Medications   Prescriptions Last Dose Informant Patient Reported? Taking? albuterol (PROVENTIL HFA,VENTOLIN HFA) 90 mcg/act inhaler   Yes No   Sig: Inhale 1 puff every 6 (six) hours as needed   cetirizine (ZyrTEC) 10 mg tablet   No No   Sig: Take 1 tablet (10 mg total) by mouth daily   dicyclomine (BENTYL) 20 mg tablet   No No   Sig: Take 1 tablet (20 mg total) by mouth 2 (two) times a day   Patient not taking: Reported on 3/25/2021   ibuprofen (MOTRIN) 600 mg tablet   Yes No   Sig: Take 600 mg by mouth every 6 (six) hours as needed   ondansetron (ZOFRAN) 4 mg tablet   No No   Sig: Take 1 tablet (4 mg total) by mouth every 6 (six) hours   ondansetron (ZOFRAN-ODT) 4 mg disintegrating tablet   No No   Sig: Take 1 tablet (4 mg total) by mouth every 8 (eight) hours as needed for nausea   Patient not taking: Reported on 3/25/2021      Facility-Administered Medications: None       History reviewed   No pertinent past medical history  Past Surgical History:   Procedure Laterality Date     SECTION      TOOTH EXTRACTION      TUBAL LIGATION         History reviewed  No pertinent family history  I have reviewed and agree with the history as documented  E-Cigarette/Vaping     E-Cigarette/Vaping Substances     Social History     Tobacco Use    Smoking status: Former Smoker     Packs/day: 0 20    Smokeless tobacco: Never Used   Substance Use Topics    Alcohol use: Not Currently    Drug use: Yes     Types: Marijuana       Review of Systems   Constitutional: Negative for chills and fever  HENT: Negative for ear pain and sore throat  Eyes: Negative for pain and visual disturbance  Respiratory: Negative for cough and shortness of breath  Cardiovascular: Negative for chest pain and palpitations  Gastrointestinal: Negative for abdominal pain and vomiting  Genitourinary: Negative for dysuria and hematuria  Musculoskeletal: Positive for back pain and gait problem  Negative for arthralgias  Skin: Negative for color change and rash  Neurological: Negative for syncope and headaches  All other systems reviewed and are negative  Physical Exam  Physical Exam  Vitals and nursing note reviewed  Constitutional:       General: She is not in acute distress  Appearance: She is well-developed  HENT:      Head: Normocephalic and atraumatic  Eyes:      Conjunctiva/sclera: Conjunctivae normal    Cardiovascular:      Rate and Rhythm: Normal rate and regular rhythm  Pulmonary:      Effort: Pulmonary effort is normal  No respiratory distress  Breath sounds: Normal breath sounds  Abdominal:      Palpations: Abdomen is soft  Tenderness: There is no abdominal tenderness  Musculoskeletal:         General: No deformity or signs of injury  Skin:     General: Skin is warm and dry  Neurological:      Mental Status: She is alert and oriented to person, place, and time        Sensory: Sensation is intact  No sensory deficit  Motor: No weakness  Comments: Strength 5/5 dorsiflexion and plantarflexion  Sensation intact, BLE  Intact proprioception BLE         Vital Signs  ED Triage Vitals [10/05/22 1759]   Temperature Pulse Respirations Blood Pressure SpO2   (!) 97 3 °F (36 3 °C) 80 (!) 24 147/83 98 %      Temp Source Heart Rate Source Patient Position - Orthostatic VS BP Location FiO2 (%)   Oral -- -- -- --      Pain Score       10 - Worst Possible Pain           Vitals:    10/05/22 1759 10/05/22 2056   BP: 147/83 133/77   Pulse: 80 78         Visual Acuity      ED Medications  Medications   lidocaine (LIDODERM) 5 % patch 1 patch (1 patch Topical Medication Applied 10/5/22 1852)   ketorolac (TORADOL) injection 30 mg (30 mg Intramuscular Given 10/5/22 1852)   cyclobenzaprine (FLEXERIL) tablet 10 mg (10 mg Oral Given 10/5/22 1852)       Diagnostic Studies  Results Reviewed     None                 XR lumbar spine 2 or 3 views    (Results Pending)              Procedures  Procedures         ED Course                                             MDM  Number of Diagnoses or Management Options  Acute bilateral low back pain with bilateral sciatica  Diagnosis management comments: Patient presenting with back pain, no red flag signs, no focal neurologic deficits on exam   Suspect patient may have strained a muscle or potentially herniated disc  Discussed these likely diagnoses with the patient  She is very tearful on exam   Will treat symptomatically in the ED  Fifty given her level of pain, will proceed with x-ray at her request   Ultimately, x-ray resulted unremarkable  Patient was discharged home with prescription for Medrol Dosepak, Flexeril, and naproxen  She was referred to the [facility] program   Return precautions provided at discharge         Amount and/or Complexity of Data Reviewed  Tests in the radiology section of CPT®: ordered and reviewed        Disposition  Final diagnoses: Acute bilateral low back pain with bilateral sciatica     Time reflects when diagnosis was documented in both MDM as applicable and the Disposition within this note     Time User Action Codes Description Comment    10/5/2022  7:43 PM Serafin Cherry Add [S36 06,  R65 41] Acute bilateral low back pain with bilateral sciatica       ED Disposition     ED Disposition   Discharge    Condition   Stable    Date/Time   Wed Oct 5, 2022  7:43 PM    Comment   [address] [place] discharge to home/self care                 Follow-up Information    None         Discharge Medication List as of 10/5/2022  7:46 PM      START taking these medications    Details   cyclobenzaprine (FLEXERIL) 10 mg tablet Take 1 tablet (10 mg total) by mouth 2 (two) times a day as needed for muscle spasms, Starting Wed 10/5/2022, Normal      lidocaine (Lidoderm) 5 % Apply 1 patch topically daily for 7 days Remove & Discard patch within 12 hours or as directed by MD, Starting Wed 10/5/2022, Until Wed 10/12/2022, Normal      methylPREDNISolone 4 MG tablet therapy pack Use as directed on package, Normal      naproxen (Naprosyn) 500 mg tablet Take 1 tablet (500 mg total) by mouth 2 (two) times a day with meals, Starting Wed 10/5/2022, Normal         CONTINUE these medications which have NOT CHANGED    Details   albuterol (PROVENTIL HFA,VENTOLIN HFA) 90 mcg/act inhaler Inhale 1 puff every 6 (six) hours as needed, Starting Wed 12/4/2019, Historical Med      cetirizine (ZyrTEC) 10 mg tablet Take 1 tablet (10 mg total) by mouth daily, Starting Thu 4/14/2022, Normal      dicyclomine (BENTYL) 20 mg tablet Take 1 tablet (20 mg total) by mouth 2 (two) times a day, Starting Tue 1/28/2020, Print      ibuprofen (MOTRIN) 600 mg tablet Take 600 mg by mouth every 6 (six) hours as needed, Starting Tue 12/31/2019, Historical Med      ondansetron (ZOFRAN) 4 mg tablet Take 1 tablet (4 mg total) by mouth every 6 (six) hours, Starting Thu 3/25/2021, Normal ondansetron (ZOFRAN-ODT) 4 mg disintegrating tablet Take 1 tablet (4 mg total) by mouth every 8 (eight) hours as needed for nausea, Starting Tue 1/28/2020, Print             No discharge procedures on file      PDMP Review     None          ED Provider  Electronically Signed by           Rodger Wakefield MD  10/05/22 0522

## 2023-04-28 ENCOUNTER — OFFICE VISIT (OUTPATIENT)
Dept: FAMILY MEDICINE CLINIC | Facility: CLINIC | Age: 28
End: 2023-04-28

## 2023-04-28 VITALS
SYSTOLIC BLOOD PRESSURE: 124 MMHG | OXYGEN SATURATION: 98 % | RESPIRATION RATE: 16 BRPM | HEART RATE: 90 BPM | HEIGHT: 63 IN | BODY MASS INDEX: 39.16 KG/M2 | TEMPERATURE: 97 F | WEIGHT: 221 LBS | DIASTOLIC BLOOD PRESSURE: 80 MMHG

## 2023-04-28 DIAGNOSIS — Z00.00 ANNUAL PHYSICAL EXAM: Primary | ICD-10-CM

## 2023-04-28 DIAGNOSIS — G47.9 SLEEP DISORDER: ICD-10-CM

## 2023-04-28 DIAGNOSIS — Z11.59 NEED FOR HEPATITIS C SCREENING TEST: ICD-10-CM

## 2023-04-28 RX ORDER — TRAZODONE HYDROCHLORIDE 50 MG/1
50 TABLET ORAL
Qty: 90 TABLET | Refills: 5 | Status: SHIPPED | OUTPATIENT
Start: 2023-04-28

## 2023-04-28 NOTE — PROGRESS NOTES
[address] [facility]    NAME: Alannah Sorensne  AGE: 28 y o  SEX: female  : 1995     DATE: 2023     Assessment and Plan:     Problem List Items Addressed This Visit        Other    BMI 39 0-39 9,adult     Wt Readings from Last 3 Encounters:   23 100 kg (221 lb)   22 102 kg (224 lb 12 8 oz)   21 106 kg (233 lb 4 oz)     Made recommendation to improve sleep  We will have patient follow-up in 2 weeks to go over lab results and her sleep hygiene after the new medication  Relevant Orders    TSH, 3rd generation with Free T4 reflex    CBC and differential    Comprehensive metabolic panel    Lipid panel    : HIV 1/2 AB/AG w Reflex [facility] for 2 yr old and above    Sleep disorder     Trazodone ordered for better sleep  Instructed patient to take 30 minutes before bedtime  Can also take with melatonin  We will follow-up in a few weeks to see how her sleep is  Her sleep disturbance stems from inability to fall asleep when she is in her bed  She states that she sleeps 3 hours a night and is tired throughout the day  Can consider sleep study if patient's symptoms persist         Relevant Medications    traZODone (DESYREL) 50 mg tablet   Other Visit Diagnoses     Annual physical exam    -  Primary    Need for hepatitis C screening test        Relevant Orders    Hepatitis C Antibody          Immunizations and preventive care screenings were discussed with patient today  Appropriate education was printed on patient's after visit summary  Counseling:  Alcohol/drug use: discussed moderation in alcohol intake, the recommendations for healthy alcohol use, and avoidance of illicit drug use  · Dental Health: discussed importance of regular tooth brushing, flossing, and dental visits  BMI Counseling: Body mass index is 39 15 kg/m²   The BMI is above normal  Nutrition recommendations include decreasing portion sizes  Exercise recommendations include vigorous physical activity 75 minutes/week  Rationale for BMI follow-up plan is due to patient being overweight or obese  Sleep deficiency can be contributing to patient's weight gain  At this time we will make recommendation for patient to have appropriate sleep hygiene to see if it has an effect on her weight  We will also obtain some labs  Depression Screening and Follow-up Plan: Patient was screened for depression during today's encounter  They screened negative with a PHQ-2 score of 2  Return in 1 year (on 4/28/2024)  Chief Complaint:     Chief Complaint   Patient presents with   • Establish Care     NP est care hx of weight issues and hormonal changes   • Generalized Body Aches     States has a lot of body aches since she was younger       History of Present Illness:     Adult Annual Physical   Patient here for a comprehensive physical exam  The patient reports no problems  Diet and Physical Activity  · Diet/Nutrition: well balanced diet  · Exercise: moderate cardiovascular exercise and vigorous cardiovascular exercise  Depression Screening  PHQ-2/9 Depression Screening    Little interest or pleasure in doing things: 1 - several days  Feeling down, depressed, or hopeless: 1 - several days  PHQ-2 Score: 2  PHQ-2 Interpretation: Negative depression screen       General Health  · Sleep: sleeps poorly  · Hearing: normal - none   · Vision: no vision problems  · Dental: regular dental visits  Review of Systems:     Review of Systems   Constitutional: Negative for chills and fever  HENT: Negative for ear pain and sore throat  Eyes: Negative for pain and visual disturbance  Respiratory: Negative for cough and shortness of breath  Cardiovascular: Negative for chest pain and palpitations  Gastrointestinal: Negative for abdominal pain and vomiting  Genitourinary: Negative for dysuria and hematuria  Musculoskeletal: Negative for arthralgias and back pain  Skin: Negative for color change and rash  Neurological: Negative for seizures and syncope  All other systems reviewed and are negative  Past Medical History:     No past medical history on file  Past Surgical History:     Past Surgical History:   Procedure Laterality Date   •  SECTION     • TOOTH EXTRACTION     • TUBAL LIGATION        Social History:     Social History     Socioeconomic History   • Marital status: Single     Spouse name: None   • Number of children: None   • Years of education: None   • Highest education level: None   Occupational History   • None   Tobacco Use   • Smoking status: Former     Packs/day: 0 20     Types: Cigarettes   • Smokeless tobacco: Never   Substance and Sexual Activity   • Alcohol use: Not Currently   • Drug use: Yes     Types: Marijuana   • Sexual activity: None   Other Topics Concern   • None   Social History Narrative   • None     Social Determinants of Health     Financial Resource Strain: Medium Risk   • Difficulty of Paying Living Expenses: Somewhat hard   Food Insecurity: Food Insecurity Present   • Worried About Running Out of Food in the Last Year: Sometimes true   • Ran Out of Food in the Last Year: Sometimes true   Transportation Needs: Unmet Transportation Needs   • Lack of Transportation (Medical): Patient refused   • Lack of Transportation (Non-Medical): Yes   Physical Activity: Not on file   Stress: Not on file   Social Connections: Not on file   Intimate Partner Violence: Not on file   Housing Stability: Not on file      Family History:     No family history on file     Current Medications:     Current Outpatient Medications   Medication Sig Dispense Refill   • traZODone (DESYREL) 50 mg tablet Take 1 tablet (50 mg total) by mouth daily at bedtime 90 tablet 5   • albuterol (PROVENTIL HFA,VENTOLIN HFA) 90 mcg/act inhaler Inhale 1 puff every 6 (six) hours as needed     • cetirizine (ZyrTEC) "10 mg tablet Take 1 tablet (10 mg total) by mouth daily 30 tablet 0   • cyclobenzaprine (FLEXERIL) 10 mg tablet Take 1 tablet (10 mg total) by mouth 2 (two) times a day as needed for muscle spasms 20 tablet 0   • dicyclomine (BENTYL) 20 mg tablet Take 1 tablet (20 mg total) by mouth 2 (two) times a day (Patient not taking: Reported on 3/25/2021) 20 tablet 0   • ibuprofen (MOTRIN) 600 mg tablet Take 600 mg by mouth every 6 (six) hours as needed     • lidocaine (Lidoderm) 5 % Apply 1 patch topically daily for 7 days Remove & Discard patch within 12 hours or as directed by MD Bolaños patch 0   • methylPREDNISolone 4 MG tablet therapy pack Use as directed on package 21 tablet 0   • naproxen (Naprosyn) 500 mg tablet Take 1 tablet (500 mg total) by mouth 2 (two) times a day with meals 30 tablet 0   • ondansetron (ZOFRAN) 4 mg tablet Take 1 tablet (4 mg total) by mouth every 6 (six) hours 9 tablet 0   • ondansetron (ZOFRAN-ODT) 4 mg disintegrating tablet Take 1 tablet (4 mg total) by mouth every 8 (eight) hours as needed for nausea (Patient not taking: Reported on 3/25/2021) 20 tablet 0     No current facility-administered medications for this visit  Allergies:     No Known Allergies   Physical Exam:     /80 (BP Location: Left arm, Patient Position: Sitting, Cuff Size: Large)   Pulse 90   Temp (!) 97 °F (36 1 °C) (Temporal)   Resp 16   Ht 5' 3\" (1 6 m)   Wt 100 kg (221 lb)   LMP 03/15/2023 (Approximate) Comment: states irregular  SpO2 98%   BMI 39 15 kg/m²     Physical Exam  Vitals and nursing note reviewed  Constitutional:       General: She is not in acute distress  Appearance: She is well-developed  She is obese  HENT:      Head: Normocephalic and atraumatic  Eyes:      Conjunctiva/sclera: Conjunctivae normal    Cardiovascular:      Rate and Rhythm: Normal rate and regular rhythm  Heart sounds: No murmur heard  Pulmonary:      Effort: Pulmonary effort is normal  No respiratory distress        " Breath sounds: Normal breath sounds  Abdominal:      Palpations: Abdomen is soft  Tenderness: There is no abdominal tenderness  Musculoskeletal:         General: No swelling  Cervical back: Neck supple  Skin:     General: Skin is warm and dry  Capillary Refill: Capillary refill takes less than 2 seconds  Neurological:      Mental Status: She is alert     Psychiatric:         Mood and Affect: Mood normal           Horacio Sanchez

## 2023-04-29 PROBLEM — G47.9 SLEEP DISORDER: Status: ACTIVE | Noted: 2023-04-29

## 2023-04-29 NOTE — ASSESSMENT & PLAN NOTE
Wt Readings from Last 3 Encounters:   04/28/23 100 kg (221 lb)   04/14/22 102 kg (224 lb 12 8 oz)   03/25/21 106 kg (233 lb 4 oz)     Made recommendation to improve sleep  We will have patient follow-up in 2 weeks to go over lab results and her sleep hygiene after the new medication

## 2023-04-29 NOTE — ASSESSMENT & PLAN NOTE
Trazodone ordered for better sleep  Instructed patient to take 30 minutes before bedtime  Can also take with melatonin  We will follow-up in a few weeks to see how her sleep is  Her sleep disturbance stems from inability to fall asleep when she is in her bed  She states that she sleeps 3 hours a night and is tired throughout the day    Can consider sleep study if patient's symptoms persist

## 2023-05-03 ENCOUNTER — APPOINTMENT (OUTPATIENT)
Dept: LAB | Facility: CLINIC | Age: 28
End: 2023-05-03

## 2023-05-03 DIAGNOSIS — Z11.59 NEED FOR HEPATITIS C SCREENING TEST: ICD-10-CM

## 2023-05-03 LAB
ALBUMIN SERPL BCP-MCNC: 4 G/DL (ref 3.5–5)
ALP SERPL-CCNC: 52 U/L (ref 46–116)
ALT SERPL W P-5'-P-CCNC: 22 U/L (ref 12–78)
ANION GAP SERPL CALCULATED.3IONS-SCNC: 1 MMOL/L (ref 4–13)
AST SERPL W P-5'-P-CCNC: 10 U/L (ref 5–45)
BASOPHILS # BLD AUTO: 0.02 THOUSANDS/ÂΜL (ref 0–0.1)
BASOPHILS NFR BLD AUTO: 0 % (ref 0–1)
BILIRUB SERPL-MCNC: 0.5 MG/DL (ref 0.2–1)
BUN SERPL-MCNC: 8 MG/DL (ref 5–25)
CALCIUM SERPL-MCNC: 9.4 MG/DL (ref 8.3–10.1)
CHLORIDE SERPL-SCNC: 110 MMOL/L (ref 96–108)
CHOLEST SERPL-MCNC: 165 MG/DL
CO2 SERPL-SCNC: 27 MMOL/L (ref 21–32)
CREAT SERPL-MCNC: 0.75 MG/DL (ref 0.6–1.3)
EOSINOPHIL # BLD AUTO: 0.07 THOUSAND/ÂΜL (ref 0–0.61)
EOSINOPHIL NFR BLD AUTO: 1 % (ref 0–6)
ERYTHROCYTE [DISTWIDTH] IN BLOOD BY AUTOMATED COUNT: 13.5 % (ref 11.6–15.1)
GFR SERPL CREATININE-BSD FRML MDRD: 108 ML/MIN/1.73SQ M
GLUCOSE P FAST SERPL-MCNC: 92 MG/DL (ref 65–99)
HCT VFR BLD AUTO: 41.6 % (ref 34.8–46.1)
HDLC SERPL-MCNC: 32 MG/DL
HGB BLD-MCNC: 13 G/DL (ref 11.5–15.4)
IMM GRANULOCYTES # BLD AUTO: 0.02 THOUSAND/UL (ref 0–0.2)
IMM GRANULOCYTES NFR BLD AUTO: 0 % (ref 0–2)
LDLC SERPL CALC-MCNC: 99 MG/DL (ref 0–100)
LYMPHOCYTES # BLD AUTO: 1.95 THOUSANDS/ÂΜL (ref 0.6–4.47)
LYMPHOCYTES NFR BLD AUTO: 30 % (ref 14–44)
MCH RBC QN AUTO: 26.9 PG (ref 26.8–34.3)
MCHC RBC AUTO-ENTMCNC: 31.3 G/DL (ref 31.4–37.4)
MCV RBC AUTO: 86 FL (ref 82–98)
MONOCYTES # BLD AUTO: 0.32 THOUSAND/ÂΜL (ref 0.17–1.22)
MONOCYTES NFR BLD AUTO: 5 % (ref 4–12)
NEUTROPHILS # BLD AUTO: 4.05 THOUSANDS/ÂΜL (ref 1.85–7.62)
NEUTS SEG NFR BLD AUTO: 64 % (ref 43–75)
NONHDLC SERPL-MCNC: 133 MG/DL
NRBC BLD AUTO-RTO: 0 /100 WBCS
PLATELET # BLD AUTO: 234 THOUSANDS/UL (ref 149–390)
PMV BLD AUTO: 11.1 FL (ref 8.9–12.7)
POTASSIUM SERPL-SCNC: 4.2 MMOL/L (ref 3.5–5.3)
PROT SERPL-MCNC: 7.4 G/DL (ref 6.4–8.4)
RBC # BLD AUTO: 4.83 MILLION/UL (ref 3.81–5.12)
SODIUM SERPL-SCNC: 138 MMOL/L (ref 135–147)
TRIGL SERPL-MCNC: 169 MG/DL
TSH SERPL DL<=0.05 MIU/L-ACNC: 1.67 UIU/ML (ref 0.45–4.5)
WBC # BLD AUTO: 6.43 THOUSAND/UL (ref 4.31–10.16)

## 2023-05-04 LAB
HCV AB SER QL: NORMAL
HIV 1+2 AB+HIV1 P24 AG SERPL QL IA: NORMAL
HIV 2 AB SERPL QL IA: NORMAL
HIV1 AB SERPL QL IA: NORMAL
HIV1 P24 AG SERPL QL IA: NORMAL

## 2023-05-12 ENCOUNTER — OFFICE VISIT (OUTPATIENT)
Dept: FAMILY MEDICINE CLINIC | Facility: CLINIC | Age: 28
End: 2023-05-12

## 2023-05-12 VITALS
HEART RATE: 88 BPM | OXYGEN SATURATION: 95 % | BODY MASS INDEX: 39.16 KG/M2 | DIASTOLIC BLOOD PRESSURE: 70 MMHG | RESPIRATION RATE: 16 BRPM | TEMPERATURE: 97.6 F | WEIGHT: 221 LBS | SYSTOLIC BLOOD PRESSURE: 110 MMHG | HEIGHT: 63 IN

## 2023-05-12 DIAGNOSIS — E78.1 HYPERTRIGLYCERIDEMIA: ICD-10-CM

## 2023-05-12 DIAGNOSIS — G47.9 SLEEP DISORDER: ICD-10-CM

## 2023-05-12 DIAGNOSIS — E66.01 MORBID OBESITY (HCC): ICD-10-CM

## 2023-05-12 NOTE — PROGRESS NOTES
Assessment/Plan:    1  Adult BMI 40 0-44 9 kg/sq m (HCC)  -     semaglutide, 1 mg/dose, (Ozempic) 4 mg/3 mL injection pen; Inject 0 75 mL (1 mg total) under the skin once a week    2  Morbid obesity ([address] )  Assessment & Plan:  BMI currently 40  Failed conservative measures  At risk for weight related issues such as DM  At this point appropriate to start medical therapy with semaglutide with close monitoring  Orders:  -     semaglutide, 1 mg/dose, (Ozempic) 4 mg/3 mL injection pen; Inject 0 75 mL (1 mg total) under the skin once a week    3  Hypertriglyceridemia  Assessment & Plan:  Lab Results   Component Value Date    CHOLESTEROL 165 05/03/2023     Lab Results   Component Value Date    HDL 32 (L) 05/03/2023     Lab Results   Component Value Date    TRIG 169 (H) 05/03/2023     Lab Results   Component Value Date    NONHDLC 133 05/03/2023     Elevated triglycerides  Continue with lifestyle modification  Orders:  -     semaglutide, 1 mg/dose, (Ozempic) 4 mg/3 mL injection pen; Inject 0 75 mL (1 mg total) under the skin once a week    4  Sleep disorder  Assessment & Plan:  Much improved with trazodone at night  Patient now sleeping 7 hours a night  Continue with current medication therapy  Subjective:      Patient ID: Isai Sheehan is a 28 y o  female  Patient coming in for follow-up for weight check  Patient was in a few weeks ago and we obtained initial labs  Thyroid hormone was within normal limits  Other labs were otherwise unremarkable  Patient states that she is ready to try other management for her weight such as medication  Conservative measures including physical exercise and change in diet has not worked  Other nature of our visit today was to go over different treatment modalities        The following portions of the patient's history were reviewed and updated as appropriate: allergies, current medications, past family history, past medical history, past social history, "past surgical history, and problem list     Review of Systems   Constitutional: Positive for unexpected weight change  Negative for chills and fever  HENT: Negative for ear pain and sore throat  Eyes: Negative for pain and visual disturbance  Respiratory: Negative for cough and shortness of breath  Cardiovascular: Negative for chest pain and palpitations  Gastrointestinal: Negative for abdominal pain and vomiting  Genitourinary: Negative for dysuria and hematuria  Musculoskeletal: Negative for arthralgias and back pain  Skin: Negative for color change and rash  Neurological: Negative for seizures and syncope  All other systems reviewed and are negative  Objective:      /70 (BP Location: Left arm, Patient Position: Sitting, Cuff Size: Large)   Pulse 88   Temp 97 6 °F (36 4 °C) (Temporal)   Resp 16   Ht 5' 3\" (1 6 m)   Wt 100 kg (221 lb)   LMP 05/07/2023 (Exact Date)   SpO2 95%   BMI 39 15 kg/m²          Physical Exam  Constitutional:       General: She is not in acute distress  Appearance: Normal appearance  She is obese  HENT:      Nose: No congestion or rhinorrhea  Eyes:      Extraocular Movements: Extraocular movements intact  Pupils: Pupils are equal, round, and reactive to light  Cardiovascular:      Rate and Rhythm: Normal rate and regular rhythm  Pulses: Normal pulses  Heart sounds: Normal heart sounds  No murmur heard  No gallop  Pulmonary:      Effort: Pulmonary effort is normal       Breath sounds: Normal breath sounds  No wheezing, rhonchi or rales  Abdominal:      General: Bowel sounds are normal  There is no distension  Palpations: Abdomen is soft  There is no mass  Tenderness: There is no abdominal tenderness  Hernia: No hernia is present  Skin:     General: Skin is warm  Coloration: Skin is not jaundiced  Findings: No bruising  Neurological:      General: No focal deficit present        Mental Status: " She is alert and oriented to person, place, and time  Psychiatric:         Mood and Affect: Mood normal          Behavior: Behavior normal          Thought Content:  Thought content normal            Elo Emerson DO   Family Medicine PGY-2  5/15/2023

## 2023-05-15 PROBLEM — E66.01 MORBID OBESITY (HCC): Status: ACTIVE | Noted: 2023-05-15

## 2023-05-15 PROBLEM — E78.1 HYPERTRIGLYCERIDEMIA: Status: ACTIVE | Noted: 2023-05-15

## 2023-05-15 NOTE — ASSESSMENT & PLAN NOTE
BMI Counseling: Body mass index is 39 15 kg/m²  The BMI is above normal  Pharmacotherapy was ordered for patient to aid in weight loss

## 2023-05-15 NOTE — ASSESSMENT & PLAN NOTE
Much improved with trazodone at night  Patient now sleeping 7 hours a night  Continue with current medication therapy

## 2023-05-15 NOTE — ASSESSMENT & PLAN NOTE
BMI currently 40  Failed conservative measures  At risk for weight related issues such as DM  At this point appropriate to start medical therapy with semaglutide with close monitoring

## 2023-05-15 NOTE — ASSESSMENT & PLAN NOTE
Lab Results   Component Value Date    CHOLESTEROL 165 05/03/2023     Lab Results   Component Value Date    HDL 32 (L) 05/03/2023     Lab Results   Component Value Date    TRIG 169 (H) 05/03/2023     Lab Results   Component Value Date    NONHDLC 133 05/03/2023     Elevated triglycerides  Continue with lifestyle modification

## 2023-05-23 ENCOUNTER — TELEPHONE (OUTPATIENT)
Dept: FAMILY MEDICINE CLINIC | Facility: CLINIC | Age: 28
End: 2023-05-23

## 2023-05-23 NOTE — TELEPHONE ENCOUNTER
PCP SIGNATURE NEEDED FOR Prior Authorization FORM RECEIVED VIA FAX AND PLACED IN PCP FOLDER TO BE DELIVERED AT ASSIGNED TIMES      Ozempic ( 1MG/Dose) 4MG/3ML Pen Injectors

## 2023-06-06 ENCOUNTER — TELEPHONE (OUTPATIENT)
Dept: FAMILY MEDICINE CLINIC | Facility: CLINIC | Age: 28
End: 2023-06-06

## 2023-06-06 NOTE — TELEPHONE ENCOUNTER
06/06/23    Hi Dr Shaniqua Puga     Pt called Office and stated that she received a letter from the Insurance and it stated that the following med : Ozempic ( 1MG/Dose) 4MG/3ML Pen Injectors was not approved  Pt wants to know if there is another alternative Med that Insurance will approve  Any questions, please contact Pt

## 2023-06-09 DIAGNOSIS — E78.1 HYPERTRIGLYCERIDEMIA: ICD-10-CM

## 2023-06-09 DIAGNOSIS — E66.01 MORBID OBESITY (HCC): ICD-10-CM

## 2023-06-09 RX ORDER — NALTREXONE HYDROCHLORIDE AND BUPROPION HYDROCHLORIDE 8; 90 MG/1; MG/1
8-90 TABLET, EXTENDED RELEASE ORAL DAILY
Qty: 60 TABLET | Refills: 3 | Status: SHIPPED | OUTPATIENT
Start: 2023-06-09 | End: 2023-08-07

## 2023-06-27 ENCOUNTER — TELEPHONE (OUTPATIENT)
Dept: FAMILY MEDICINE CLINIC | Facility: CLINIC | Age: 28
End: 2023-06-27

## 2023-06-27 NOTE — TELEPHONE ENCOUNTER
06/27/23    Pt called office and stated that she needs a Pre Auth for the following med: Naltrexone-buPROPion HCl ER (Contrave) 8-90 MG TB12      Any questions, please contact Pt.

## 2023-07-10 ENCOUNTER — OFFICE VISIT (OUTPATIENT)
Dept: FAMILY MEDICINE CLINIC | Facility: CLINIC | Age: 28
End: 2023-07-10

## 2023-07-10 VITALS
BODY MASS INDEX: 39.44 KG/M2 | DIASTOLIC BLOOD PRESSURE: 76 MMHG | HEART RATE: 78 BPM | OXYGEN SATURATION: 99 % | TEMPERATURE: 97.8 F | HEIGHT: 63 IN | RESPIRATION RATE: 18 BRPM | WEIGHT: 222.6 LBS | SYSTOLIC BLOOD PRESSURE: 100 MMHG

## 2023-07-10 DIAGNOSIS — Z02.4 ENCOUNTER FOR DRIVER'S LICENSE HISTORY AND PHYSICAL: Primary | ICD-10-CM

## 2023-07-10 PROCEDURE — 99213 OFFICE O/P EST LOW 20 MIN: CPT | Performed by: FAMILY MEDICINE

## 2023-07-10 NOTE — PROGRESS NOTES
Name: Sheba Dyson      : 1995      MRN: [id number]  Encounter Provider: Marcela Moyer MD  Encounter Date: 7/10/2023   Encounter department: [address]     1. Encounter for 's license history and physical  Assessment & Plan:  - Patient does not have any of the following that would prevent control of a motor vehicle: Neurological disorders, neuropsychiatric disorders, circulatory disorder, cardiac disorder, hypertension, uncontrolled epilepsy, uncontrolled diabetes, cognitive impairment, alcohol abuse, drug abuse, conditions causing repeated lapses of consciousness (e.g. Epilepsy, narcolepsy, hysteria, etc). Exam: vision, neck range of motion, CV, lungs, CN         - Advised to report back to [address] [place] [address] immediately if any new conditions or limitations develop. Discussed importance of seat belt safety for  and all passengers in the car. Discussed importance of patient’s knowledge of car seat and booster seat use when applicable. Advised not to use alcohol, drugs, or substances which inhibit ability to operate a vehicle. Do not use cell phone or other devices which can distract while operating a vehicle. Reviewed importance of familiarizing one's self with the rules and regulations outlined in drivers manual.      Form filled out, signed, and handed back to the patient. Marcela Moyer MD  07/10/23  4:50 PM             Subjective     This is a pleasant 27 yo patient with no significant PMH who presents to the office requesting completion of 's Permit Physician Form. Patient states that she is in good standing health and is currently not on any medications or substances that impair cognition. Patient denies having debilitating disease, neurologic deficits, history of seizure disorder, or psychiatric conditions. Review of Systems   Constitutional: Negative for chills and fever.    HENT: Negative for congestion, ear pain, rhinorrhea and sinus pain. Eyes: Negative for visual disturbance. Respiratory: Negative for chest tightness, shortness of breath and wheezing. Cardiovascular: Negative for chest pain and palpitations. Gastrointestinal: Negative for abdominal pain, constipation, diarrhea and vomiting. Endocrine: Negative for polyuria. Genitourinary: Negative for dysuria. Musculoskeletal: Negative for arthralgias and myalgias. Neurological: Negative for dizziness, syncope and light-headedness. Psychiatric/Behavioral: Negative for hallucinations, self-injury and suicidal ideas. No past medical history on file. Past Surgical History:   Procedure Laterality Date   •  SECTION     • TOOTH EXTRACTION     • TUBAL LIGATION       No family history on file.   Social History     Socioeconomic History   • Marital status: Single     Spouse name: None   • Number of children: None   • Years of education: None   • Highest education level: None   Occupational History   • None   Tobacco Use   • Smoking status: Former     Packs/day: 0.20     Types: Cigarettes   • Smokeless tobacco: Never   Substance and Sexual Activity   • Alcohol use: Not Currently   • Drug use: Yes     Types: Marijuana   • Sexual activity: None   Other Topics Concern   • None   Social History Narrative   • None     Social Determinants of Health     Financial Resource Strain: Medium Risk (2023)    Overall Financial Resource Strain (CARDIA)    • Difficulty of Paying Living Expenses: Somewhat hard   Food Insecurity: Food Insecurity Present (2023)    Hunger Vital Sign    • Worried About Running Out of Food in the Last Year: Sometimes true    • Ran Out of Food in the Last Year: Sometimes true   Transportation Needs: Unmet Transportation Needs (2023)    PRAPARE - Transportation    • Lack of Transportation (Medical): Patient refused    • Lack of Transportation (Non-Medical): Yes   Physical Activity: Not on file Stress: Not on file   Social Connections: Not on file   Intimate Partner Violence: Not on file   Housing Stability: Not on file     Current Outpatient Medications on File Prior to Visit   Medication Sig   • albuterol (PROVENTIL HFA,VENTOLIN HFA) 90 mcg/act inhaler Inhale 1 puff every 6 (six) hours as needed   • cetirizine (ZyrTEC) 10 mg tablet Take 1 tablet (10 mg total) by mouth daily   • cyclobenzaprine (FLEXERIL) 10 mg tablet Take 1 tablet (10 mg total) by mouth 2 (two) times a day as needed for muscle spasms   • dicyclomine (BENTYL) 20 mg tablet Take 1 tablet (20 mg total) by mouth 2 (two) times a day (Patient not taking: Reported on 3/25/2021)   • ibuprofen (MOTRIN) 600 mg tablet Take 600 mg by mouth every 6 (six) hours as needed   • lidocaine (Lidoderm) 5 % Apply 1 patch topically daily for 7 days Remove & Discard patch within 12 hours or as directed by MD   • methylPREDNISolone 4 MG tablet therapy pack Use as directed on package   • Naltrexone-buPROPion HCl ER (Contrave) 8-90 MG TB12 Take 8-90 mg combined by mouth in the morning   • naproxen (Naprosyn) 500 mg tablet Take 1 tablet (500 mg total) by mouth 2 (two) times a day with meals   • ondansetron (ZOFRAN) 4 mg tablet Take 1 tablet (4 mg total) by mouth every 6 (six) hours   • ondansetron (ZOFRAN-ODT) 4 mg disintegrating tablet Take 1 tablet (4 mg total) by mouth every 8 (eight) hours as needed for nausea (Patient not taking: Reported on 3/25/2021)   • semaglutide, 1 mg/dose, (Ozempic) 4 mg/3 mL injection pen Inject 0.75 mL (1 mg total) under the skin once a week   • traZODone (DESYREL) 50 mg tablet Take 1 tablet (50 mg total) by mouth daily at bedtime     No Known Allergies  Immunization History   Administered Date(s) Administered   • Meningococcal, Unknown Serogroups 04/19/2010   • Tdap 04/19/2010       Objective     /76 (BP Location: Left arm, Patient Position: Sitting, Cuff Size: Standard)   Pulse 78   Temp 97.8 °F (36.6 °C) (Temporal)   Resp 18   Ht 5' 3" (1.6 m)   Wt 101 kg (222 lb 9.6 oz)   LMP 06/18/2023 (Approximate)   SpO2 99%   BMI 39.43 kg/m²     Physical Exam  Constitutional:       Appearance: Normal appearance. HENT:      Head: Normocephalic and atraumatic. Nose: Nose normal.   Eyes:      Conjunctiva/sclera: Conjunctivae normal.   Cardiovascular:      Rate and Rhythm: Normal rate. Heart sounds: Normal heart sounds. Pulmonary:      Effort: Pulmonary effort is normal.      Breath sounds: Normal breath sounds. Abdominal:      General: There is no distension. Palpations: Abdomen is soft. Tenderness: There is no abdominal tenderness. Musculoskeletal:         General: Normal range of motion. Cervical back: Normal range of motion. Skin:     General: Skin is warm and dry. Neurological:      Mental Status: She is alert and oriented to person, place, and time.    Psychiatric:         Behavior: Behavior normal.       Alisha Marroquin MD

## 2023-07-10 NOTE — ASSESSMENT & PLAN NOTE
- Patient does not have any of the following that would prevent control of a motor vehicle: Neurological disorders, neuropsychiatric disorders, circulatory disorder, cardiac disorder, hypertension, uncontrolled epilepsy, uncontrolled diabetes, cognitive impairment, alcohol abuse, drug abuse, conditions causing repeated lapses of consciousness (e.g. Epilepsy, narcolepsy, hysteria, etc). Exam: vision, neck range of motion, CV, lungs, CN         - Advised to report back to [address] [place] [address] immediately if any new conditions or limitations develop. Discussed importance of seat belt safety for  and all passengers in the car. Discussed importance of patient’s knowledge of car seat and booster seat use when applicable. Advised not to use alcohol, drugs, or substances which inhibit ability to operate a vehicle. Do not use cell phone or other devices which can distract while operating a vehicle. Reviewed importance of familiarizing one's self with the rules and regulations outlined in drivers manual.      Form filled out, signed, and handed back to the patient.     Chris Ballard MD  07/10/23  4:50 PM

## 2023-07-29 ENCOUNTER — NURSE TRIAGE (OUTPATIENT)
Dept: OTHER | Facility: OTHER | Age: 28
End: 2023-07-29

## 2023-07-29 NOTE — TELEPHONE ENCOUNTER
Pt states the directions she received for using the Contrave cause her to use more pills than the prescription allows for. She has refills on her bottle, but the \[facility] advised her she cannot refill medication until 8/11/23. Pt following the dosing instructions provided to her which use all of the medicine faster than the pharmacy will refill them. Advice offered per protocol.

## 2023-07-29 NOTE — TELEPHONE ENCOUNTER
Reason for Disposition  • [1] Prescription refill request for NON-ESSENTIAL medicine (i.e., no harm to patient if med not taken) AND [2] triager unable to refill per department policy    Answer Assessment - Initial Assessment Questions  1. DRUG NAME: "What medicine do you need to have refilled?"      Contrave   2. REFILLS REMAINING: "How many refills are remaining?" (Note: The label on the medicine or pill bottle will show how many refills are remaining. If there are no refills remaining, then a renewal may be needed.)      Pt has refills however the pharmacy told her she cannot have a refill until 23. The way the meds have been prescribed causes her to use far more than the monthly supply  3. EXPIRATION DATE: "What is the expiration date?" (Note: The label states when the prescription will , and thus can no longer be refilled.)      n/a  4. PRESCRIBING HCP: "Who prescribed it?" Reason: If prescribed by specialist, call should be referred to that group. Dr Nathalia Pace  5. SYMPTOMS: "Do you have any symptoms?"      none  6.  PREGNANCY: "Is there any chance that you are pregnant?" "When was your last menstrual period?"      n/a    Protocols used: MEDICATION REFILL AND RENEWAL CALL-[facility]

## 2023-07-29 NOTE — TELEPHONE ENCOUNTER
Regarding: needs clarification on Contrave medication instructions  ----- Message from Karina Kirk sent at 7/29/2023  9:36 AM EDT -----  " I need clarification on my medication directions because I only have enough for tomorrow so it seems that I've been taking it wrong according to the directions that I got from the pharmacy. "

## 2023-08-07 ENCOUNTER — TELEMEDICINE (OUTPATIENT)
Dept: FAMILY MEDICINE CLINIC | Facility: CLINIC | Age: 28
End: 2023-08-07

## 2023-08-07 DIAGNOSIS — E78.1 HYPERTRIGLYCERIDEMIA: ICD-10-CM

## 2023-08-07 DIAGNOSIS — E66.01 MORBID OBESITY (HCC): ICD-10-CM

## 2023-08-07 PROCEDURE — G2012 BRIEF CHECK IN BY MD/QHP: HCPCS | Performed by: FAMILY MEDICINE

## 2023-08-07 RX ORDER — NALTREXONE HYDROCHLORIDE AND BUPROPION HYDROCHLORIDE 8; 90 MG/1; MG/1
8-90 TABLET, EXTENDED RELEASE ORAL 3 TIMES DAILY
Qty: 90 TABLET | Refills: 3 | Status: SHIPPED | OUTPATIENT
Start: 2023-08-07 | End: 2023-11-05

## 2023-08-07 NOTE — PROGRESS NOTES
Assessment/Plan:    1. Adult BMI 40.0-44.9 kg/sq m [facility])  Comments: Will call insurance for authorization of 2 pills in the morning and 1 pill at night. Will have patient follow-up in a month to review side effects. Orders:  -     Naltrexone-buPROPion HCl ER (Contrave) 8-90 MG TB12; Take 8-90 mg combined by mouth 3 (three) times a day    2. Morbid obesity (HCC)  -     Naltrexone-buPROPion HCl ER (Contrave) 8-90 MG TB12; Take 8-90 mg combined by mouth 3 (three) times a day    3. Hypertriglyceridemia  -     Naltrexone-buPROPion HCl ER (Contrave) 8-90 MG TB12; Take 8-90 mg combined by mouth 3 (three) times a day         Subjective:      Patient ID: Ashly Manley is a 33 y.o. female. Past medical history notable for morbid obesity, currently on Contrave is calling regarding her medication. Per my instructions, patient took 1 pill once a day for 1 week followed by 1 pill twice a day for another week now she is on 2 pills in the morning and 1 at night. Patient reports a 5 pound weight loss. Patient reports however, that she has gone through her prescription and insurance will not authorize for more. Patient denies any side effects. The following portions of the patient's history were reviewed and updated as appropriate: allergies, current medications, past family history, past medical history, past social history, past surgical history, and problem list.    Review of Systems   Constitutional: Negative for chills and fever. HENT: Negative for ear pain and sore throat. Eyes: Negative for pain and visual disturbance. Respiratory: Negative for cough and shortness of breath. Cardiovascular: Negative for chest pain and palpitations. Gastrointestinal: Negative for abdominal pain and vomiting. Genitourinary: Negative for dysuria and hematuria. Musculoskeletal: Negative for arthralgias and back pain. Skin: Negative for color change and rash.    Neurological: Negative for seizures and syncope. All other systems reviewed and are negative. Objective:      LMP 06/18/2023 (Approximate)          Physical Exam  Vitals reviewed: Limited due to virtual nature of appointment.            Don Culp DO   Family Medicine PGY-2  8/9/2023

## 2023-09-08 PROBLEM — Z02.4 ENCOUNTER FOR DRIVER'S LICENSE HISTORY AND PHYSICAL: Status: RESOLVED | Noted: 2023-07-10 | Resolved: 2023-09-08

## 2023-10-25 ENCOUNTER — HOSPITAL ENCOUNTER (EMERGENCY)
Facility: HOSPITAL | Age: 28
Discharge: HOME | End: 2023-10-25
Attending: EMERGENCY MEDICINE | Admitting: EMERGENCY MEDICINE
Payer: COMMERCIAL

## 2023-10-25 VITALS
SYSTOLIC BLOOD PRESSURE: 126 MMHG | DIASTOLIC BLOOD PRESSURE: 80 MMHG | OXYGEN SATURATION: 97 % | TEMPERATURE: 98 F | RESPIRATION RATE: 18 BRPM | HEART RATE: 76 BPM

## 2023-10-25 DIAGNOSIS — S05.01XA CORNEAL ABRASION, RIGHT, INITIAL ENCOUNTER: Primary | ICD-10-CM

## 2023-10-25 PROCEDURE — 99282 EMERGENCY DEPT VISIT SF MDM: CPT

## 2023-10-25 PROCEDURE — 99284 EMERGENCY DEPT VISIT MOD MDM: CPT | Performed by: EMERGENCY MEDICINE

## 2023-10-25 RX ORDER — LORATADINE 10 MG/1
10 TABLET ORAL DAILY
Qty: 20 TABLET | Refills: 0 | Status: SHIPPED | OUTPATIENT
Start: 2023-10-25

## 2023-10-25 RX ORDER — TETRAHYDROZOLINE HCL 0.05 %
1 DROPS OPHTHALMIC (EYE) 4 TIMES DAILY PRN
Qty: 15 ML | Refills: 0 | Status: SHIPPED | OUTPATIENT
Start: 2023-10-25

## 2023-10-25 RX ORDER — TETRACAINE HYDROCHLORIDE 5 MG/ML
1 SOLUTION OPHTHALMIC ONCE
Status: COMPLETED | OUTPATIENT
Start: 2023-10-25 | End: 2023-10-25

## 2023-10-25 RX ORDER — ERYTHROMYCIN 5 MG/G
OINTMENT OPHTHALMIC
Qty: 1 G | Refills: 0 | Status: SHIPPED | OUTPATIENT
Start: 2023-10-25

## 2023-10-25 RX ADMIN — TETRACAINE HYDROCHLORIDE 1 DROP: 5 SOLUTION OPHTHALMIC at 08:13

## 2023-10-25 RX ADMIN — FLUORESCEIN SODIUM 1 STRIP: 1 STRIP OPHTHALMIC at 08:13

## 2023-10-25 NOTE — ED PROVIDER NOTES
History  Chief Complaint   Patient presents with    Eye Swelling     Pt c/o right eye swelling and drainage. 32 y/o F presents to the ED this AM for acute onset R eye pain and swelling. She states that she woke up and her eye was crusted shut. She wiped it away with a warm cloth and she was able to open the eye. She does not believe it is swollen, but it is itchy and picky when she blinks. She has been crying this past week due to a friend passing away so she thinks she may have scratched it. She wears contacts but has not worn them in the past month. Admits to mild light sensitivity. No pain with eye movements. No associated orbital swelling or pain per patient. No headache. No blurry vision. Prior to Admission Medications   Prescriptions Last Dose Informant Patient Reported? Taking? albuterol (PROVENTIL HFA,VENTOLIN HFA) 90 mcg/act inhaler   Yes Yes   Sig: Inhale 1 puff every 6 (six) hours as needed   ibuprofen (MOTRIN) 600 mg tablet   Yes Yes   Sig: Take 600 mg by mouth every 6 (six) hours as needed   semaglutide, 1 mg/dose, (Ozempic) 4 mg/3 mL injection pen   No Yes   Sig: Inject 0.75 mL (1 mg total) under the skin once a week      Facility-Administered Medications: None       History reviewed. No pertinent past medical history. Past Surgical History:   Procedure Laterality Date     SECTION      TOOTH EXTRACTION      TUBAL LIGATION         History reviewed. No pertinent family history. I have reviewed and agree with the history as documented. E-Cigarette/Vaping     E-Cigarette/Vaping Substances     Social History     Tobacco Use    Smoking status: Former     Packs/day: 0.20     Types: Cigarettes    Smokeless tobacco: Never   Substance Use Topics    Alcohol use: Not Currently    Drug use: Yes     Types: Marijuana        Review of Systems   Constitutional: Negative. HENT:  Negative for facial swelling. Eyes:  Positive for photophobia, pain and itching.  Negative for discharge, redness and visual disturbance. Respiratory:  Negative for chest tightness and shortness of breath. Cardiovascular:  Negative for chest pain. Gastrointestinal:  Negative for nausea and vomiting. Musculoskeletal:  Negative for neck pain and neck stiffness. Skin:  Negative for rash and wound. Neurological:  Negative for dizziness, facial asymmetry, light-headedness and headaches. Physical Exam  ED Triage Vitals [10/25/23 0714]   Temperature Pulse Respirations Blood Pressure SpO2   98 °F (36.7 °C) 76 18 126/80 97 %      Temp Source Heart Rate Source Patient Position - Orthostatic VS BP Location FiO2 (%)   Oral Monitor -- Right arm --      Pain Score       --             Orthostatic Vital Signs  Vitals:    10/25/23 0714   BP: 126/80   Pulse: 76       Physical Exam  Vitals reviewed. Constitutional:       General: She is not in acute distress. Appearance: Normal appearance. She is not ill-appearing. HENT:      Head: Normocephalic and atraumatic. Right Ear: External ear normal.      Left Ear: External ear normal.      Nose: Nose normal.   Eyes:      General: No scleral icterus. Right eye: No discharge. Left eye: No discharge. Extraocular Movements: Extraocular movements intact. Conjunctiva/sclera: Conjunctivae normal.      Pupils: Pupils are equal, round, and reactive to light. Comments: No TTP of the orbit. No lesions , erythema, or edema of the orbit or surrounding tissues. No pain with EOM. Skin:     General: Skin is warm and dry. Neurological:      Mental Status: She is alert and oriented to person, place, and time.    Psychiatric:         Mood and Affect: Mood normal.         Behavior: Behavior normal.         ED Medications  Medications   tetracaine 0.5 % ophthalmic solution 1 drop (1 drop Right Eye Given 10/25/23 0813)   fluorescein sodium sterile ophthalmic strip 1 strip (1 strip Right Eye Given 10/25/23 0813)       Diagnostic Studies  Results Reviewed       None                   No orders to display         Procedures  Procedures      ED Course                                       Medical Decision Making  34 y/o F presents for new onset R eye pain/itching. Upon initial examination the R eye does not appear to be infected and suspicion for conjunctivitis is low. No contact lens wear significantly decreases risk for pseudomonas infection. Concern for corneal abrasion based on recent history. Slit lamp examination revealed small superficial corneal abrasion of the R eye located laterally and slightly inferior to the iris. No foreign body visualized. Discussed findings with patient as well as treatment options and self care instructions. Medications sent to her pharmacy of choice. Patient will follow treatment plan and f/u with primary care office for follow up as needed. Patient discharged in stable condition. Risk  OTC drugs. Prescription drug management. Disposition  Final diagnoses:   Corneal abrasion, right, initial encounter     Time reflects when diagnosis was documented in both MDM as applicable and the Disposition within this note       Time User Action Codes Description Comment    10/25/2023  8:25 AM Marina Agarwal Add [S05.01XA] Corneal abrasion, right, initial encounter           ED Disposition       ED Disposition   Discharge    Condition   Stable    Date/Time   Wed Oct 25, 2023  8:25 AM    Comment   [facility] discharge to home/self care.                    Follow-up Information       Follow up With Specialties Details Why Contact Info Additional [facility] Emergency Department Emergency Medicine Go to  If symptoms worsen [address] [address]  [address] [facility] Emergency Department, [address], [facility], [place], [address]            Discharge Medication List as of 10/25/2023  8:29 AM        START taking these medications Details   erythromycin (ILOTYCIN) ophthalmic ointment Place a 1/2 inch ribbon of ointment into the lower eyelid. , Normal      loratadine (CLARITIN) 10 mg tablet Take 1 tablet (10 mg total) by mouth daily, Starting Wed 10/25/2023, Normal      tetrahydrozoline (VISINE) 0.05 % ophthalmic solution Administer 1 drop to the right eye 4 (four) times a day as needed for irritation, Starting Wed 10/25/2023, Normal           CONTINUE these medications which have NOT CHANGED    Details   albuterol (PROVENTIL HFA,VENTOLIN HFA) 90 mcg/act inhaler Inhale 1 puff every 6 (six) hours as needed, Starting Wed 12/4/2019, Historical Med      ibuprofen (MOTRIN) 600 mg tablet Take 600 mg by mouth every 6 (six) hours as needed, Starting Tue 12/31/2019, Historical Med      semaglutide, 1 mg/dose, (Ozempic) 4 mg/3 mL injection pen Inject 0.75 mL (1 mg total) under the skin once a week, Starting Fri 5/12/2023, Normal           No discharge procedures on file. PDMP Review       None             ED Provider  Attending physically available and evaluated Robb Connelly. I managed the patient along with the ED Attending.     Electronically Signed by           Michael Crowder MD  10/25/23 4785

## 2023-10-25 NOTE — Clinical Note
Ventura Jannylily was seen and treated in our emergency department on 10/25/2023. No restrictions            Diagnosis:     Wm Keller  may return to work on return date. She may return on this date: 10/26/2023         If you have any questions or concerns, please don't hesitate to call.       Marley Cooper MD    ______________________________           _______________          _______________  Hospital Representative                              Date                                Time

## 2023-10-25 NOTE — ED ATTENDING ATTESTATION
10/25/2023  IRuth, , saw and evaluated the patient. I have discussed the patient with the resident/non-physician practitioner and agree with the resident's/non-physician practitioner's findings, Plan of Care, and MDM as documented in the resident's/non-physician practitioner's note, except where noted. All available labs and Radiology studies were reviewed. I was present for key portions of any procedure(s) performed by the resident/non-physician practitioner and I was immediately available to provide assistance. At this point I agree with the current assessment done in the Emergency Department. I have conducted an independent evaluation of this patient a history and physical is as follows: 28y F w/ R eye redness, tearing, redness this am - was crusted this am.  +photophobia. Hx of corneal abrasion and feels similar. Wears contacts but hasn't for months. Exam WNWD nad, R eye injected, tearing. PERRL, EOMI, no periorbital erythema/edema appreciated, mmm, neck supple, resp non-labored, cv regular rate, abd nd, ext no cce, skin dry, neuro non-focal, normal mood. A/p R eye irritation - will stain and eval for corneal abrasion. No s/s periorbital/orbital cellulitis, no s/s glaucoma/IOP      ED Course         Critical Care Time  Procedures    1. Corneal abrasion, right, initial encounter  tetrahydrozoline (VISINE) 0.05 % ophthalmic solution    loratadine (CLARITIN) 10 mg tablet    erythromycin (ILOTYCIN) ophthalmic ointment        Time reflects when diagnosis was documented in both MDM as applicable and the Disposition within this note       Time User Action Codes Description Comment    10/25/2023  8:25 AM Yunier Ely Add [S05.01XA] Corneal abrasion, right, initial encounter           ED Disposition       ED Disposition   Discharge    Condition   Stable    Date/Time   Wed Oct 25, 2023  8:25 AM    Comment   [facility] discharge to home/self care.                    Follow-up Information       Follow up With Specialties Details Why Contact Info Additional [facility] Emergency Department Emergency Medicine Go to  If symptoms worsen [address] [id number] [address] [facility] Emergency Department, [address], [place], [place], [address]

## 2023-11-10 ENCOUNTER — TELEPHONE (OUTPATIENT)
Dept: FAMILY MEDICINE CLINIC | Facility: CLINIC | Age: 28
End: 2023-11-10

## 2023-11-10 NOTE — TELEPHONE ENCOUNTER
Patient came in to request refill of following medication. She states that when she was at the hospital, a nurse in training accidentally discontinued her medications.       Naltrexone-buPROPion HCl ER (Contrave) 8-90 MG TB12

## 2023-11-11 ENCOUNTER — TELEPHONE (OUTPATIENT)
Dept: FAMILY MEDICINE CLINIC | Facility: CLINIC | Age: 28
End: 2023-11-11

## 2023-11-11 NOTE — TELEPHONE ENCOUNTER
Hi, my name is Luis Alberto Brown. I'm calling because I would like to schedule an appointment and also speak with my doctor. If you can give me a call back at [phone]. Again that's [phone] Thank you. Walter Watson.         Chart review - Appointment was already scheduled for 11/30

## 2023-11-12 DIAGNOSIS — E66.01 MORBID OBESITY (HCC): ICD-10-CM

## 2023-11-30 ENCOUNTER — OFFICE VISIT (OUTPATIENT)
Dept: FAMILY MEDICINE CLINIC | Facility: CLINIC | Age: 28
End: 2023-11-30

## 2023-11-30 VITALS
HEIGHT: 63 IN | BODY MASS INDEX: 37.92 KG/M2 | DIASTOLIC BLOOD PRESSURE: 80 MMHG | HEART RATE: 81 BPM | RESPIRATION RATE: 18 BRPM | SYSTOLIC BLOOD PRESSURE: 118 MMHG | OXYGEN SATURATION: 98 % | WEIGHT: 214 LBS | TEMPERATURE: 97.9 F

## 2023-11-30 DIAGNOSIS — E66.01 MORBID OBESITY (HCC): ICD-10-CM

## 2023-11-30 PROCEDURE — 99213 OFFICE O/P EST LOW 20 MIN: CPT | Performed by: FAMILY MEDICINE

## 2023-11-30 RX ORDER — ONDANSETRON 4 MG/1
4 TABLET, FILM COATED ORAL EVERY 8 HOURS PRN
Qty: 30 TABLET | Refills: 3 | Status: SHIPPED | OUTPATIENT
Start: 2023-11-30

## 2023-11-30 NOTE — PROGRESS NOTES
Assessment/Plan:    1. Morbid obesity ([address])  Assessment & Plan:  Recommended maxing therapy on Contrave twice daily. Will order Zofran for intermittent nausea vomiting. Weight loss goal of 2 pounds per month. We will follow-up with patient in 3 months to assess her weight loss status. If continues to have nausea or vomiting, can consider switching to a GLP-1. Orders:  -     ondansetron (ZOFRAN) 4 mg tablet; Take 1 tablet (4 mg total) by mouth every 8 (eight) hours as needed for nausea or vomiting  -     Naltrexone-buPROPion HCl ER 8-90 MG TB12; 8-90mg, 2 tablets  BID    2. Adult BMI 40.0-44.9 kg/sq m (HCC)  -     ondansetron (ZOFRAN) 4 mg tablet; Take 1 tablet (4 mg total) by mouth every 8 (eight) hours as needed for nausea or vomiting  -     Naltrexone-buPROPion HCl ER 8-90 MG TB12; 8-90mg, 2 tablets  BID         Subjective:      Patient ID: Aleksey Frey is a 33 y.o. female. Medical history notable for obesity and, is coming in for follow-up appointment after initiating her treatment with Contrave. Patient reports that she has been doing well on her medication though she has some. Of nausea and vomiting. Patient reports that she is lost 10 pounds since initiating the therapy. Patient also reports increase in lifestyle modification including decreasing her portion size and attending the gym        The following portions of the patient's history were reviewed and updated as appropriate: allergies, current medications, past family history, past medical history, past social history, past surgical history, and problem list.    Review of Systems   Constitutional:  Negative for chills and fever. HENT:  Negative for ear pain and sore throat. Eyes:  Negative for pain and visual disturbance. Respiratory:  Negative for cough and shortness of breath. Cardiovascular:  Negative for chest pain and palpitations. Gastrointestinal:  Positive for nausea. Negative for abdominal pain and vomiting. Genitourinary:  Negative for dysuria and hematuria. Musculoskeletal:  Negative for arthralgias and back pain. Skin:  Negative for color change and rash. Neurological:  Negative for seizures and syncope. All other systems reviewed and are negative. Objective:      /80 (BP Location: Left arm, Patient Position: Sitting, Cuff Size: Standard)   Pulse 81   Temp 97.9 °F (36.6 °C) (Temporal)   Resp 18   Ht 5' 3" (1.6 m)   Wt 97.1 kg (214 lb)   LMP 11/23/2023 (Exact Date)   SpO2 98%   BMI 37.91 kg/m²          Physical Exam  Constitutional:       General: She is not in acute distress. Appearance: Normal appearance. She is obese. HENT:      Nose: No congestion or rhinorrhea. Eyes:      Extraocular Movements: Extraocular movements intact. Pupils: Pupils are equal, round, and reactive to light. Cardiovascular:      Rate and Rhythm: Normal rate and regular rhythm. Pulses: Normal pulses. Heart sounds: Normal heart sounds. No murmur heard. No gallop. Pulmonary:      Effort: Pulmonary effort is normal.      Breath sounds: Normal breath sounds. No wheezing, rhonchi or rales. Abdominal:      General: Bowel sounds are normal. There is no distension. Palpations: Abdomen is soft. There is no mass. Tenderness: There is no abdominal tenderness. Hernia: No hernia is present. Skin:     General: Skin is warm. Coloration: Skin is not jaundiced. Findings: No bruising. Neurological:      General: No focal deficit present. Mental Status: She is alert and oriented to person, place, and time. Psychiatric:         Mood and Affect: Mood normal.         Behavior: Behavior normal.         Thought Content:  Thought content normal.           Destiny Elliott DO   Family Medicine PGY-3  12/1/2023

## 2023-12-01 ENCOUNTER — TELEPHONE (OUTPATIENT)
Dept: FAMILY MEDICINE CLINIC | Facility: CLINIC | Age: 28
End: 2023-12-01

## 2023-12-01 NOTE — ASSESSMENT & PLAN NOTE
Recommended maxing therapy on Contrave twice daily. Will order Zofran for intermittent nausea vomiting. Weight loss goal of 2 pounds per month. We will follow-up with patient in 3 months to assess her weight loss status. If continues to have nausea or vomiting, can consider switching to a GLP-1.

## 2023-12-01 NOTE — TELEPHONE ENCOUNTER
Pt called stating the prescription you sent to the pharmacy was wrong she was originally taking the quantity dose of 90 and you sent 60 so it went down . She stated that you up her dose to 2 in the morning 2 at night . Asked if you can resend the medication to pharmacy. Also her pharmacy needs a pre authorization .

## 2023-12-03 DIAGNOSIS — E66.01 MORBID OBESITY (HCC): ICD-10-CM

## 2023-12-04 ENCOUNTER — TELEPHONE (OUTPATIENT)
Dept: FAMILY MEDICINE CLINIC | Facility: CLINIC | Age: 28
End: 2023-12-04

## 2023-12-04 NOTE — TELEPHONE ENCOUNTER
Pt call regarding Naltrexone-buPROPion HCl ER 8-90 MG TB12 stated it should be 4 tablets twice a day and not 2 please send new script to pharmacy thank you.

## 2023-12-05 ENCOUNTER — TELEPHONE (OUTPATIENT)
Dept: FAMILY MEDICINE CLINIC | Facility: CLINIC | Age: 28
End: 2023-12-05

## 2023-12-05 NOTE — TELEPHONE ENCOUNTER
Pt called in stating she needs a prior authorization for the below, please advise    Naltrexone-buPROPion HCl ER 8-90 MG TB12

## 2023-12-08 NOTE — TELEPHONE ENCOUNTER
Patient came in asking what is going on with getting the auth for the medication below. And how she suppose to be taking it. She mentioned she suppose to take 2x in the am and 2x in the evening. Please advise, patient is running low.

## 2023-12-15 NOTE — TELEPHONE ENCOUNTER
Hi my name is Venessa Roman. My birthday is 1995. I am calling in regards to my prescription. I called multiple times. My prescription was entered incorrectly and I still need a verification or something along those lines with my insurance because it's showing that it's not covered. So please, if you guys can give me a call back or please check the messages and make sure that it gets situated. I really do need my prescription. It's been more than 10 days without it. Thank you so much. Bye.         Patient states she has been without her medication for about 10 days, please advise

## 2024-01-22 ENCOUNTER — OFFICE VISIT (OUTPATIENT)
Dept: FAMILY MEDICINE CLINIC | Facility: CLINIC | Age: 29
End: 2024-01-22

## 2024-01-22 VITALS
WEIGHT: 221.7 LBS | DIASTOLIC BLOOD PRESSURE: 78 MMHG | OXYGEN SATURATION: 98 % | HEIGHT: 63 IN | SYSTOLIC BLOOD PRESSURE: 130 MMHG | BODY MASS INDEX: 39.28 KG/M2 | TEMPERATURE: 98.2 F | HEART RATE: 70 BPM | RESPIRATION RATE: 18 BRPM

## 2024-01-22 DIAGNOSIS — E66.01 MORBID OBESITY (HCC): ICD-10-CM

## 2024-01-22 PROCEDURE — 99213 OFFICE O/P EST LOW 20 MIN: CPT | Performed by: FAMILY MEDICINE

## 2024-01-22 RX ORDER — ONDANSETRON 4 MG/1
4 TABLET, FILM COATED ORAL EVERY 8 HOURS PRN
Qty: 20 TABLET | Refills: 0 | Status: SHIPPED | OUTPATIENT
Start: 2024-01-22

## 2024-01-22 NOTE — PROGRESS NOTES
Assessment/Plan:    1. Morbid obesity (HCC)  Assessment & Plan:  Will do an initial trial of Wegovy 0.5.  Continue to encourage lifestyle modification such as reducing portion sizes and physical activities.  Will have patient follow-up in 8 weeks and titrate the medication up if she is able to tolerate it.  Will have patient utilize Zofran as needed for nausea like symptoms.    Orders:  -     Naltrexone-buPROPion HCl ER 8-90 MG TB12; 8-90mg, 4 tablets  BID  -     Semaglutide-Weight Management (WEGOVY) 0.5 MG/0.5ML; Inject 0.5 mL (0.5 mg total) under the skin once a week  -     Ambulatory Referral to Weight Management; Future  -     ondansetron (ZOFRAN) 4 mg tablet; Take 1 tablet (4 mg total) by mouth every 8 (eight) hours as needed for nausea or vomiting    2. Adult BMI 40.0-44.9 kg/sq m ([facility])  -     Naltrexone-buPROPion HCl ER 8-90 MG TB12; 8-90mg, 4 tablets  BID  -     Semaglutide-Weight Management (WEGOVY) 0.5 MG/0.5ML; Inject 0.5 mL (0.5 mg total) under the skin once a week  -     Ambulatory Referral to Weight Management; Future  -     ondansetron (ZOFRAN) 4 mg tablet; Take 1 tablet (4 mg total) by mouth every 8 (eight) hours as needed for nausea or vomiting         Subjective:      Patient ID: Hetal Barahona is a 28 y.o. female.    Past medical history notable for morbid obesity previously seen and started on Contrave medication.  Patient lost 10 pounds following the medication however due to insurance having stopped covering the medication patient is no longer on the medication and now has gained 10 pounds.  Patient would like to try different weight loss regimen.  Call patient insurance company and they noted that Wegovy would be acceptable for her.  Patient denies any nausea, vomiting or history of pancreatitis.        The following portions of the patient's history were reviewed and updated as appropriate: allergies, current medications, past family history, past medical history, past social  "history, past surgical history, and problem list.    Review of Systems   Constitutional:  Negative for chills and fever.   HENT:  Negative for ear pain and sore throat.    Eyes:  Negative for pain and visual disturbance.   Respiratory:  Negative for cough and shortness of breath.    Cardiovascular:  Negative for chest pain and palpitations.   Gastrointestinal:  Negative for abdominal pain and vomiting.   Genitourinary:  Negative for dysuria and hematuria.   Musculoskeletal:  Negative for arthralgias and back pain.   Skin:  Negative for color change and rash.   Neurological:  Negative for seizures and syncope.   All other systems reviewed and are negative.        Objective:      /78 (BP Location: Left arm, Patient Position: Sitting, Cuff Size: Standard)   Pulse 70   Temp 98.2 °F (36.8 °C) (Temporal)   Resp 18   Ht 5' 3\" (1.6 m)   Wt 101 kg (221 lb 11.2 oz)   LMP 12/25/2023 (Approximate)   SpO2 98%   Breastfeeding No   BMI 39.27 kg/m²          Physical Exam  Constitutional:       General: She is not in acute distress.     Appearance: Normal appearance. She is obese.   HENT:      Nose: No congestion or rhinorrhea.   Eyes:      Extraocular Movements: Extraocular movements intact.      Pupils: Pupils are equal, round, and reactive to light.   Cardiovascular:      Rate and Rhythm: Normal rate and regular rhythm.      Pulses: Normal pulses.      Heart sounds: Normal heart sounds. No murmur heard.     No gallop.   Pulmonary:      Effort: Pulmonary effort is normal.      Breath sounds: Normal breath sounds. No wheezing, rhonchi or rales.   Abdominal:      General: Bowel sounds are normal. There is no distension.      Palpations: Abdomen is soft. There is no mass.      Tenderness: There is no abdominal tenderness.      Hernia: No hernia is present.   Skin:     General: Skin is warm.      Coloration: Skin is not jaundiced.      Findings: No bruising.   Neurological:      General: No focal deficit present.      " Mental Status: She is alert and oriented to person, place, and time.   Psychiatric:         Mood and Affect: Mood normal.         Behavior: Behavior normal.         Thought Content: Thought content normal.           Cory Farris DO   Family Medicine PGY-3  1/23/2024

## 2024-01-24 ENCOUNTER — TELEPHONE (OUTPATIENT)
Dept: FAMILY MEDICINE CLINIC | Facility: CLINIC | Age: 29
End: 2024-01-24

## 2024-01-24 NOTE — ASSESSMENT & PLAN NOTE
Will do an initial trial of Wegovy 0.5.  Continue to encourage lifestyle modification such as reducing portion sizes and physical activities.  Will have patient follow-up in 8 weeks and titrate the medication up if she is able to tolerate it.  Will have patient utilize Zofran as needed for nausea like symptoms.

## 2024-01-25 ENCOUNTER — TELEPHONE (OUTPATIENT)
Dept: FAMILY MEDICINE CLINIC | Facility: CLINIC | Age: 29
End: 2024-01-25

## 2024-01-25 NOTE — TELEPHONE ENCOUNTER
PCP SIGNATURE NEEDED FOR [facility] FORM RECEIVED VIA FAX AND PLACED IN PCP FOLDER TO BE DELIVERED AT ASSIGNED TIMES.        Prior auth  Wegovy

## 2024-02-12 ENCOUNTER — TELEPHONE (OUTPATIENT)
Dept: FAMILY MEDICINE CLINIC | Facility: CLINIC | Age: 29
End: 2024-02-12

## 2024-02-12 NOTE — TELEPHONE ENCOUNTER
Patient call that she need her prescription for   Wegovy  be send to a different pharmacy due to her pharmacy do not have them     The pharmacy she want to use is       [address], [place] [address]

## 2024-02-14 DIAGNOSIS — E66.01 MORBID OBESITY (HCC): ICD-10-CM

## 2024-03-09 DIAGNOSIS — E66.01 MORBID OBESITY (HCC): ICD-10-CM

## 2024-03-11 RX ORDER — SEMAGLUTIDE 0.5 MG/.5ML
INJECTION, SOLUTION SUBCUTANEOUS
Qty: 2 ML | Refills: 0 | Status: SHIPPED | OUTPATIENT
Start: 2024-03-11 | End: 2024-03-14

## 2024-03-14 ENCOUNTER — OFFICE VISIT (OUTPATIENT)
Dept: FAMILY MEDICINE CLINIC | Facility: CLINIC | Age: 29
End: 2024-03-14

## 2024-03-14 VITALS
OXYGEN SATURATION: 99 % | HEIGHT: 63 IN | DIASTOLIC BLOOD PRESSURE: 70 MMHG | WEIGHT: 207 LBS | HEART RATE: 91 BPM | TEMPERATURE: 98 F | BODY MASS INDEX: 36.68 KG/M2 | SYSTOLIC BLOOD PRESSURE: 110 MMHG

## 2024-03-14 DIAGNOSIS — E66.8 OTHER OBESITY: Primary | ICD-10-CM

## 2024-03-14 DIAGNOSIS — E66.01 MORBID OBESITY (HCC): ICD-10-CM

## 2024-03-14 PROCEDURE — 99213 OFFICE O/P EST LOW 20 MIN: CPT | Performed by: FAMILY MEDICINE

## 2024-03-14 NOTE — PROGRESS NOTES
Assessment/Plan:    1. Other obesity    2. Morbid obesity (HCC)  Assessment & Plan:  Wt Readings from Last 3 Encounters:   03/14/24 93.9 kg (207 lb)   01/22/24 101 kg (221 lb 11.2 oz)   11/30/23 97.1 kg (214 lb)     Patient lost 14 pounds since being on the medication.  Has been a month and a half.  Will uptitrate medication from .5 mg to 1.7.  Will follow-up in 8 weeks for subsequent weight encounter and to monitor side effects.    Orders:  -     Semaglutide-Weight Management (WEGOVY) 1.7 MG/0.75ML; Inject 0.75 mL (1.7 mg total) under the skin once a week    3. Adult BMI 40.0-44.9 kg/sq m ([facility])  -     Semaglutide-Weight Management (WEGOVY) 1.7 MG/0.75ML; Inject 0.75 mL (1.7 mg total) under the skin once a week         Subjective:      Patient ID: Hetal Barahona is a 28 y.o. female.    Past medical history notable for morbid obesity.  Patient coming in for weight loss management.  Patient currently on Wegovy.  Patient tolerating medication well with no side effects.  Patient denies any nausea.  Patient has lost significant amount of pounds since being on the weight loss medication.  Greater than 10 pounds.        The following portions of the patient's history were reviewed and updated as appropriate: allergies, current medications, past family history, past medical history, past social history, past surgical history, and problem list.    Review of Systems   Constitutional:  Negative for chills and fever.   HENT:  Negative for ear pain and sore throat.    Eyes:  Negative for pain and visual disturbance.   Respiratory:  Negative for cough and shortness of breath.    Cardiovascular:  Negative for chest pain and palpitations.   Gastrointestinal:  Negative for abdominal pain and vomiting.   Genitourinary:  Negative for dysuria and hematuria.   Musculoskeletal:  Negative for arthralgias and back pain.   Skin:  Negative for color change and rash.   Neurological:  Negative for seizures and syncope.   All other  "systems reviewed and are negative.        Objective:      /70 (BP Location: Right arm, Patient Position: Sitting, Cuff Size: Large)   Pulse 91   Temp 98 °F (36.7 °C) (Temporal)   Ht 5' 3\" (1.6 m)   Wt 93.9 kg (207 lb)   LMP 02/22/2024 (Approximate)   SpO2 99%   BMI 36.67 kg/m²          Physical Exam  Constitutional:       General: She is not in acute distress.     Appearance: Normal appearance. She is obese.   HENT:      Nose: No congestion or rhinorrhea.   Eyes:      Extraocular Movements: Extraocular movements intact.      Pupils: Pupils are equal, round, and reactive to light.   Cardiovascular:      Rate and Rhythm: Normal rate and regular rhythm.      Pulses: Normal pulses.      Heart sounds: Normal heart sounds. No murmur heard.     No gallop.   Pulmonary:      Effort: Pulmonary effort is normal.      Breath sounds: Normal breath sounds. No wheezing, rhonchi or rales.   Abdominal:      General: Bowel sounds are normal. There is no distension.      Palpations: Abdomen is soft. There is no mass.      Tenderness: There is no abdominal tenderness.      Hernia: No hernia is present.   Skin:     General: Skin is warm.      Coloration: Skin is not jaundiced.      Findings: No bruising.   Neurological:      General: No focal deficit present.      Mental Status: She is alert and oriented to person, place, and time.   Psychiatric:         Mood and Affect: Mood normal.         Behavior: Behavior normal.         Thought Content: Thought content normal.           Cory Farris DO   Family Medicine PGY-3  3/15/2024       "

## 2024-03-16 NOTE — ASSESSMENT & PLAN NOTE
Wt Readings from Last 3 Encounters:   03/14/24 93.9 kg (207 lb)   01/22/24 101 kg (221 lb 11.2 oz)   11/30/23 97.1 kg (214 lb)     Patient lost 14 pounds since being on the medication.  Has been a month and a half.  Will uptitrate medication from .5 mg to 1.7.  Will follow-up in 8 weeks for subsequent weight encounter and to monitor side effects.

## 2024-04-04 ENCOUNTER — TELEPHONE (OUTPATIENT)
Dept: FAMILY MEDICINE CLINIC | Facility: CLINIC | Age: 29
End: 2024-04-04

## 2024-04-08 NOTE — TELEPHONE ENCOUNTER
I was unable to complete PA on cover my meds. I reached out to [facility] VIA phone and completed the PA verbally. Case reference number is [id number]. They said we should get a response in 48-72 hours.

## 2024-05-16 ENCOUNTER — OFFICE VISIT (OUTPATIENT)
Dept: FAMILY MEDICINE CLINIC | Facility: CLINIC | Age: 29
End: 2024-05-16

## 2024-05-16 VITALS
HEART RATE: 63 BPM | OXYGEN SATURATION: 98 % | HEIGHT: 63 IN | DIASTOLIC BLOOD PRESSURE: 80 MMHG | SYSTOLIC BLOOD PRESSURE: 123 MMHG | RESPIRATION RATE: 16 BRPM | WEIGHT: 199 LBS | BODY MASS INDEX: 35.26 KG/M2 | TEMPERATURE: 98 F

## 2024-05-16 DIAGNOSIS — E66.3 OVERWEIGHT: ICD-10-CM

## 2024-05-16 PROCEDURE — 99213 OFFICE O/P EST LOW 20 MIN: CPT | Performed by: FAMILY MEDICINE

## 2024-05-16 NOTE — ASSESSMENT & PLAN NOTE
Wt Readings from Last 3 Encounters:   05/16/24 90.3 kg (199 lb)   03/14/24 93.9 kg (207 lb)   01/22/24 101 kg (221 lb 11.2 oz)     BMI Counseling: Body mass index is 35.25 kg/m². The BMI is above normal. Exercise recommendations include moderate aerobic physical activity for 150 minutes/week.    Patient has lost 20 pounds since starting the medication.  Will increase the dosage to 2.4 mg weekly.  Will continue to monitor.

## 2024-05-16 NOTE — PROGRESS NOTES
Assessment/Plan:    1. BMI 35.0-35.9,adult  Assessment & Plan:  Wt Readings from Last 3 Encounters:   05/16/24 90.3 kg (199 lb)   03/14/24 93.9 kg (207 lb)   01/22/24 101 kg (221 lb 11.2 oz)     BMI Counseling: Body mass index is 35.25 kg/m². The BMI is above normal. Exercise recommendations include moderate aerobic physical activity for 150 minutes/week.    Patient has lost 20 pounds since starting the medication.  Will increase the dosage to 2.4 mg weekly.  Will continue to monitor.    Orders:  -     Semaglutide-Weight Management (WEGOVY) 2.4 MG/0.75ML; Inject 0.75 mL (2.4 mg total) under the skin once a week  2. Overweight       Subjective:      Patient ID: Hetal Barahona is a 28 y.o. female.    Past medical history notable for morbid obesity is coming in for weight check.  Patient has been on Wegovy and has been doing well.  Patient also recently started physical activity training.  She now has a gym in her basement.  Patient denies any nausea.  She denies any side effects on the medication.        The following portions of the patient's history were reviewed and updated as appropriate: allergies, current medications, past family history, past medical history, past social history, past surgical history, and problem list.    Review of Systems   Constitutional:  Negative for chills and fever.   HENT:  Negative for ear pain and sore throat.    Eyes:  Negative for pain and visual disturbance.   Respiratory:  Negative for cough and shortness of breath.    Cardiovascular:  Negative for chest pain and palpitations.   Gastrointestinal:  Negative for abdominal pain and vomiting.   Genitourinary:  Negative for dysuria and hematuria.   Musculoskeletal:  Negative for arthralgias and back pain.   Skin:  Negative for color change and rash.   Neurological:  Negative for seizures and syncope.   All other systems reviewed and are negative.        Objective:      /80 (BP Location: Right arm, Patient Position:  "Sitting, Cuff Size: Standard)   Pulse 63   Temp 98 °F (36.7 °C) (Temporal)   Resp 16   Ht 5' 3\" (1.6 m)   Wt 90.3 kg (199 lb)   LMP 04/22/2024 (Exact Date)   SpO2 98%   BMI 35.25 kg/m²          Physical Exam  Constitutional:       General: She is not in acute distress.     Appearance: Normal appearance.   HENT:      Nose: No congestion or rhinorrhea.   Eyes:      Extraocular Movements: Extraocular movements intact.      Pupils: Pupils are equal, round, and reactive to light.   Cardiovascular:      Rate and Rhythm: Normal rate and regular rhythm.      Pulses: Normal pulses.      Heart sounds: Normal heart sounds. No murmur heard.     No gallop.   Pulmonary:      Effort: Pulmonary effort is normal.      Breath sounds: Normal breath sounds. No wheezing, rhonchi or rales.   Abdominal:      General: Bowel sounds are normal. There is no distension.      Palpations: Abdomen is soft. There is no mass.      Tenderness: There is no abdominal tenderness.      Hernia: No hernia is present.   Skin:     General: Skin is warm.      Coloration: Skin is not jaundiced.      Findings: No bruising.   Neurological:      General: No focal deficit present.      Mental Status: She is alert and oriented to person, place, and time.   Psychiatric:         Mood and Affect: Mood normal.         Behavior: Behavior normal.         Thought Content: Thought content normal.           Cory Farris,    Family Medicine PGY-3  5/16/2024       "

## 2024-06-15 DIAGNOSIS — Z00.6 ENCOUNTER FOR EXAMINATION FOR NORMAL COMPARISON OR CONTROL IN CLINICAL RESEARCH PROGRAM: ICD-10-CM

## 2024-07-17 ENCOUNTER — TELEPHONE (OUTPATIENT)
Dept: FAMILY MEDICINE CLINIC | Facility: CLINIC | Age: 29
End: 2024-07-17

## 2024-07-17 ENCOUNTER — OFFICE VISIT (OUTPATIENT)
Dept: FAMILY MEDICINE CLINIC | Facility: CLINIC | Age: 29
End: 2024-07-17

## 2024-07-17 VITALS
TEMPERATURE: 97.7 F | HEIGHT: 63 IN | OXYGEN SATURATION: 99 % | HEART RATE: 81 BPM | SYSTOLIC BLOOD PRESSURE: 113 MMHG | RESPIRATION RATE: 18 BRPM | BODY MASS INDEX: 33.03 KG/M2 | WEIGHT: 186.4 LBS | DIASTOLIC BLOOD PRESSURE: 78 MMHG

## 2024-07-17 DIAGNOSIS — E66.3 OVERWEIGHT: Primary | ICD-10-CM

## 2024-07-17 PROCEDURE — 99213 OFFICE O/P EST LOW 20 MIN: CPT | Performed by: FAMILY MEDICINE

## 2024-07-17 NOTE — PROGRESS NOTES
Assessment/Plan:    1. Overweight  2. BMI 35.0-35.9,adult  Assessment & Plan:  Wt Readings from Last 3 Encounters:   07/17/24 84.6 kg (186 lb 6.4 oz)   05/16/24 90.3 kg (199 lb)   03/14/24 93.9 kg (207 lb)     Currently on max dose of 2.4 weekly.  Continue with current regiment.  Encourage patient to add lifestyle changes such as cardiac activity.  Will follow-up with patient in 2 months.  Weight goal 170.  Can consider titrating down the medication once patient achieves weight goal.    Orders:  -     Semaglutide-Weight Management (WEGOVY) 2.4 MG/0.75ML; Inject 0.75 mL (2.4 mg total) under the skin once a week       Subjective:      Patient ID: Hetal Barahona is a 28 y.o. female.    Past medical history notable for morbid obesity, currently on weight loss management therapy is coming in for follow-up visit.  Patient has lost 13 pounds since our last visit.  She also incorporated physical activities and has been more conscientious about her eating.  Patient denies any nausea, abdominal pain or fatigue.        The following portions of the patient's history were reviewed and updated as appropriate: allergies, current medications, past family history, past medical history, past social history, past surgical history, and problem list.    Review of Systems   Constitutional:  Negative for chills and fever.   HENT:  Negative for ear pain and sore throat.    Eyes:  Negative for pain and visual disturbance.   Respiratory:  Negative for cough and shortness of breath.    Cardiovascular:  Negative for chest pain and palpitations.   Gastrointestinal:  Negative for abdominal pain and vomiting.   Genitourinary:  Negative for dysuria and hematuria.   Musculoskeletal:  Negative for arthralgias and back pain.   Skin:  Negative for color change and rash.   Neurological:  Negative for seizures and syncope.   All other systems reviewed and are negative.        Objective:      /78 (BP Location: Left arm, Patient Position:  "Sitting, Cuff Size: Standard)   Pulse 81   Temp 97.7 °F (36.5 °C) (Temporal)   Resp 18   Ht 5' 3\" (1.6 m)   Wt 84.6 kg (186 lb 6.4 oz)   SpO2 99%   BMI 33.02 kg/m²          Physical Exam  Constitutional:       General: She is not in acute distress.     Appearance: Normal appearance.   HENT:      Nose: No congestion or rhinorrhea.   Eyes:      Extraocular Movements: Extraocular movements intact.      Pupils: Pupils are equal, round, and reactive to light.   Cardiovascular:      Rate and Rhythm: Normal rate and regular rhythm.      Pulses: Normal pulses.      Heart sounds: Normal heart sounds. No murmur heard.     No gallop.   Pulmonary:      Effort: Pulmonary effort is normal.      Breath sounds: Normal breath sounds. No wheezing, rhonchi or rales.   Abdominal:      General: Bowel sounds are normal. There is no distension.      Palpations: Abdomen is soft. There is no mass.      Tenderness: There is no abdominal tenderness.      Hernia: No hernia is present.   Skin:     General: Skin is warm.      Coloration: Skin is not jaundiced.      Findings: No bruising.   Neurological:      General: No focal deficit present.      Mental Status: She is alert and oriented to person, place, and time.   Psychiatric:         Mood and Affect: Mood normal.         Behavior: Behavior normal.         Thought Content: Thought content normal.           Cory Farris,    Family Medicine PGY-3  7/17/2024       "

## 2024-07-17 NOTE — ASSESSMENT & PLAN NOTE
Wt Readings from Last 3 Encounters:   07/17/24 84.6 kg (186 lb 6.4 oz)   05/16/24 90.3 kg (199 lb)   03/14/24 93.9 kg (207 lb)     Currently on max dose of 2.4 weekly.  Continue with current regiment.  Encourage patient to add lifestyle changes such as cardiac activity.  Will follow-up with patient in 2 months.  Weight goal 170.  Can consider titrating down the medication once patient achieves weight goal.

## 2024-07-17 NOTE — TELEPHONE ENCOUNTER
Good morning,   Patient Hetal stated her medication that was prescribed   Semaglutide-Weight Management (WEGOVY) 2.4 MG/0.75ML [[id number]], needs an prior authorization before she can receive medication from the pharmacy.    [facility] Fax#: [phone]    Phone#: [phone]    Or prior Auth through website

## 2024-08-07 ENCOUNTER — TELEPHONE (OUTPATIENT)
Dept: FAMILY MEDICINE CLINIC | Facility: CLINIC | Age: 29
End: 2024-08-07

## 2024-09-03 ENCOUNTER — HOSPITAL ENCOUNTER (EMERGENCY)
Facility: HOSPITAL | Age: 29
Discharge: HOME | End: 2024-09-03
Attending: EMERGENCY MEDICINE
Payer: COMMERCIAL

## 2024-09-03 VITALS
BODY MASS INDEX: 32.96 KG/M2 | WEIGHT: 186.07 LBS | DIASTOLIC BLOOD PRESSURE: 70 MMHG | HEART RATE: 80 BPM | RESPIRATION RATE: 18 BRPM | TEMPERATURE: 98.1 F | SYSTOLIC BLOOD PRESSURE: 134 MMHG | OXYGEN SATURATION: 99 %

## 2024-09-03 DIAGNOSIS — S05.02XA ABRASION OF LEFT CORNEA, INITIAL ENCOUNTER: Primary | ICD-10-CM

## 2024-09-03 PROCEDURE — 99282 EMERGENCY DEPT VISIT SF MDM: CPT

## 2024-09-03 PROCEDURE — 99284 EMERGENCY DEPT VISIT MOD MDM: CPT | Performed by: EMERGENCY MEDICINE

## 2024-09-03 RX ORDER — KETOROLAC TROMETHAMINE 5 MG/ML
1 SOLUTION OPHTHALMIC 4 TIMES DAILY
Qty: 5 ML | Refills: 0 | Status: SHIPPED | OUTPATIENT
Start: 2024-09-03 | End: 2024-09-08

## 2024-09-03 RX ORDER — OFLOXACIN 3 MG/ML
SOLUTION/ DROPS OPHTHALMIC
Qty: 5 ML | Refills: 0 | Status: SHIPPED | OUTPATIENT
Start: 2024-09-03

## 2024-09-03 RX ORDER — TETRACAINE HYDROCHLORIDE 5 MG/ML
2 SOLUTION OPHTHALMIC ONCE
Status: COMPLETED | OUTPATIENT
Start: 2024-09-03 | End: 2024-09-03

## 2024-09-03 RX ADMIN — TETRACAINE HYDROCHLORIDE 2 DROP: 5 SOLUTION OPHTHALMIC at 11:25

## 2024-09-03 RX ADMIN — FLUORESCEIN SODIUM 1 STRIP: 1 STRIP OPHTHALMIC at 11:25

## 2024-09-03 NOTE — ED PROVIDER NOTES
History  Chief Complaint   Patient presents with    Eye Redness     Left eye redness and irritation starting yesterday. Using eye drops without relief.      29-year-old female presents for evaluation of itchiness and clear drainage from her right eye after removing her contacts yesterday.  Symptoms are constant, unchanged without modifying factors.  No cough or URI symptoms.  Patient is wearing her glasses states no vision changes.      History provided by:  Patient      Prior to Admission Medications   Prescriptions Last Dose Informant Patient Reported? Taking?   Semaglutide-Weight Management (WEGOVY) 2.4 MG/0.75ML   No No   Sig: Inject 0.75 mL (2.4 mg total) under the skin once a week   albuterol (PROVENTIL HFA,VENTOLIN HFA) 90 mcg/act inhaler   Yes No   Sig: Inhale 1 puff every 6 (six) hours as needed   erythromycin (ILOTYCIN) ophthalmic ointment   No No   Sig: Place a 1/2 inch ribbon of ointment into the lower eyelid.   ibuprofen (MOTRIN) 600 mg tablet   Yes No   Sig: Take 600 mg by mouth every 6 (six) hours as needed   loratadine (CLARITIN) 10 mg tablet   No No   Sig: Take 1 tablet (10 mg total) by mouth daily   ondansetron (ZOFRAN) 4 mg tablet   No No   Sig: Take 1 tablet (4 mg total) by mouth every 8 (eight) hours as needed for nausea or vomiting   ondansetron (ZOFRAN) 4 mg tablet   No No   Sig: Take 1 tablet (4 mg total) by mouth every 8 (eight) hours as needed for nausea or vomiting   tetrahydrozoline (VISINE) 0.05 % ophthalmic solution   No No   Sig: Administer 1 drop to the right eye 4 (four) times a day as needed for irritation      Facility-Administered Medications: None       History reviewed. No pertinent past medical history.    Past Surgical History:   Procedure Laterality Date     SECTION      TOOTH EXTRACTION      TUBAL LIGATION         History reviewed. No pertinent family history.  I have reviewed and agree with the history as documented.    E-Cigarette/Vaping    E-Cigarette Use Never  User      E-Cigarette/Vaping Substances     Social History     Tobacco Use    Smoking status: Former     Current packs/day: 0.20     Types: Cigarettes    Smokeless tobacco: Never   Vaping Use    Vaping status: Never Used   Substance Use Topics    Alcohol use: Not Currently    Drug use: Yes     Types: Marijuana       Review of Systems   Constitutional:  Negative for activity change, appetite change, fatigue and fever.   HENT:  Negative for congestion, dental problem, ear pain, rhinorrhea and sore throat.    Eyes:  Positive for discharge, redness and itching. Negative for photophobia, pain and visual disturbance.   Respiratory:  Negative for chest tightness, shortness of breath and wheezing.    Cardiovascular:  Negative for chest pain and palpitations.   Gastrointestinal:  Negative for abdominal pain, blood in stool, constipation, diarrhea, nausea and vomiting.   Endocrine: Negative for cold intolerance and heat intolerance.   Genitourinary:  Negative for dysuria, frequency and hematuria.   Musculoskeletal:  Negative for arthralgias and myalgias.   Skin:  Negative for color change, pallor and rash.   Neurological:  Negative for weakness and numbness.   Hematological:  Does not bruise/bleed easily.   Psychiatric/Behavioral:  Negative for agitation, hallucinations and suicidal ideas.        Physical Exam  Physical Exam  Constitutional:       Appearance: She is well-developed.   HENT:      Head: Normocephalic and atraumatic.   Eyes:      General: Lids are normal. Lids are everted, no foreign bodies appreciated. Vision grossly intact. Gaze aligned appropriately.      Extraocular Movements: EOM normal.      Right eye: Normal extraocular motion.      Left eye: Normal extraocular motion.      Conjunctiva/sclera:      Right eye: Right conjunctiva is not injected. No chemosis, exudate or hemorrhage.     Left eye: Left conjunctiva is injected.     Neck:      Vascular: No JVD.      Trachea: No tracheal deviation.    Cardiovascular:      Rate and Rhythm: Normal rate and regular rhythm.   Pulmonary:      Effort: No tachypnea, accessory muscle usage or respiratory distress.   Abdominal:      General: There is no distension.   Musculoskeletal:      Right lower leg: Normal.      Left lower leg: Normal.   Skin:     General: Skin is warm.      Capillary Refill: Capillary refill takes less than 2 seconds.   Neurological:      Mental Status: She is alert and oriented to person, place, and time.   Psychiatric:         Mood and Affect: Mood and affect normal.         Behavior: Behavior normal.         Vital Signs  ED Triage Vitals [09/03/24 1056]   Temperature Pulse Respirations Blood Pressure SpO2   98.1 °F (36.7 °C) 80 18 134/70 99 %      Temp src Heart Rate Source Patient Position - Orthostatic VS BP Location FiO2 (%)   -- -- -- -- --      Pain Score       --           Vitals:    09/03/24 1056   BP: 134/70   Pulse: 80         Visual Acuity      ED Medications  Medications   fluorescein sodium sterile ophthalmic strip 1 strip (1 strip Left Eye Given 9/3/24 1125)   tetracaine 0.5 % ophthalmic solution 2 drop (2 drops Left Eye Given 9/3/24 1125)       Diagnostic Studies  Results Reviewed       None                   No orders to display              Procedures  Procedures         ED Course                                               Medical Decision Making  Left eye redness and itchiness secondary to corneal abrasion.  Will treat with antibiotic eyedrops, ophthalmology follow-up.    Risk  Prescription drug management.                 Disposition  Final diagnoses:   Abrasion of left cornea, initial encounter     Time reflects when diagnosis was documented in both MDM as applicable and the Disposition within this note       Time User Action Codes Description Comment    9/3/2024 11:34 AM Nick Gibson Add [S05.02XA] Abrasion of left cornea, initial encounter           ED Disposition       ED Disposition   Discharge    Condition   Stable     Date/Time   Tue Sep 3, 2024 11:34 AM    Comment   Hetal Shoshana Barahona discharge to home/self care.                   Follow-up Information       Follow up With Specialties Details Why Contact Info    [facility]  Schedule an appointment as soon as possible for a visit in 2 days  [address] [address]  [phone]            Patient's Medications   Discharge Prescriptions    KETOROLAC (ACULAR) 0.5 % OPHTHALMIC SOLUTION    Administer 1 drop into the left eye 4 (four) times a day for 5 days       Start Date: 9/3/2024  End Date: 9/8/2024       Order Dose: 1 drop       Quantity: 5 mL    Refills: 0    OFLOXACIN (OCUFLOX) 0.3 % OPHTHALMIC SOLUTION    2 drops to each eye every 4 hours while awake for two days then 4 times a day for 5 days       Start Date: 9/3/2024  End Date: --       Order Dose: --       Quantity: 5 mL    Refills: 0       No discharge procedures on file.    PDMP Review       None            ED Provider  Electronically Signed by             Nick Gibson MD  09/03/24 4247

## 2024-09-03 NOTE — Clinical Note
Hetal Barahona was seen and treated in our emergency department on 9/3/2024.    No restrictions            Diagnosis:     Hetal  may return to work on return date.    She may return on this date: 09/04/2024         If you have any questions or concerns, please don't hesitate to call.      Nick Gibson MD    ______________________________           _______________          _______________  Hospital Representative                              Date                                Time

## 2024-09-18 ENCOUNTER — OFFICE VISIT (OUTPATIENT)
Dept: FAMILY MEDICINE CLINIC | Facility: CLINIC | Age: 29
End: 2024-09-18

## 2024-09-18 VITALS
DIASTOLIC BLOOD PRESSURE: 72 MMHG | HEART RATE: 76 BPM | HEIGHT: 63 IN | WEIGHT: 180.4 LBS | RESPIRATION RATE: 17 BRPM | BODY MASS INDEX: 31.96 KG/M2 | TEMPERATURE: 97.7 F | SYSTOLIC BLOOD PRESSURE: 112 MMHG | OXYGEN SATURATION: 99 %

## 2024-09-18 DIAGNOSIS — Z23 NEED FOR VACCINATION: ICD-10-CM

## 2024-09-18 DIAGNOSIS — L98.7 EXCESS SKIN OF ABDOMEN: Primary | ICD-10-CM

## 2024-09-18 DIAGNOSIS — Z13.9 ENCOUNTER FOR HEALTH-RELATED SCREENING: ICD-10-CM

## 2024-09-18 PROCEDURE — 99213 OFFICE O/P EST LOW 20 MIN: CPT | Performed by: FAMILY MEDICINE

## 2024-09-18 NOTE — PROGRESS NOTES
Assessment/Plan:    1. Excess skin of abdomen  Comments:  Will do ambulatory referral to plastic surgery.  Orders:  -     Ambulatory Referral to Plastic Surgery; Future  2. BMI 31  Assessment & Plan:  Wt Readings from Last 3 Encounters:   09/18/24 81.8 kg (180 lb 6.4 oz)   09/03/24 84.4 kg (186 lb 1.1 oz)   07/17/24 84.6 kg (186 lb 6.4 oz)   Doing well on Wegovy.  Continue current medication regimen.  Continue lifestyle changes.  Follow-up in 3 months.  Orders:  -     Semaglutide-Weight Management (WEGOVY) 2.4 MG/0.75ML; Inject 0.75 mL (2.4 mg total) under the skin once a week  3. Need for vaccination  4. Encounter for health-related screening  Comments:  Patient will need Pap smear and subsequent visit will schedule with provider.       Subjective:      Patient ID: Hetal Barahona is a 29 y.o. female.    Past medical history notable for morbid obesity, ischemia and for follow-up and weight loss.  Patient has lost over 40 pounds on Wegovy medication.  Patient has also added lifestyle changes such as dietary and exercise.  Patient reports to be tolerating the medication well.  She has no acute complaints at this time.        The following portions of the patient's history were reviewed and updated as appropriate: allergies, current medications, past family history, past medical history, past social history, past surgical history, and problem list.    Review of Systems   Constitutional:  Negative for chills and fever.   HENT:  Negative for ear pain and sore throat.    Eyes:  Negative for pain and visual disturbance.   Respiratory:  Negative for cough and shortness of breath.    Cardiovascular:  Negative for chest pain and palpitations.   Gastrointestinal:  Negative for abdominal pain and vomiting.   Genitourinary:  Negative for dysuria and hematuria.   Musculoskeletal:  Negative for arthralgias and back pain.   Skin:  Negative for color change and rash.   Neurological:  Negative for seizures and syncope.   All  "other systems reviewed and are negative.        Objective:      /72 (BP Location: Left arm, Patient Position: Sitting, Cuff Size: Standard)   Pulse 76   Temp 97.7 °F (36.5 °C) (Temporal)   Resp 17   Ht 5' 3\" (1.6 m)   Wt 81.8 kg (180 lb 6.4 oz)   SpO2 99%   BMI 31.96 kg/m²          Physical Exam  Constitutional:       General: She is not in acute distress.     Appearance: Normal appearance.   HENT:      Nose: No congestion or rhinorrhea.   Eyes:      Extraocular Movements: Extraocular movements intact.      Pupils: Pupils are equal, round, and reactive to light.   Cardiovascular:      Rate and Rhythm: Normal rate and regular rhythm.      Pulses: Normal pulses.      Heart sounds: Normal heart sounds. No murmur heard.     No gallop.   Pulmonary:      Effort: Pulmonary effort is normal.      Breath sounds: Normal breath sounds. No wheezing, rhonchi or rales.   Abdominal:      General: Bowel sounds are normal. There is no distension.      Palpations: Abdomen is soft. There is no mass.      Tenderness: There is no abdominal tenderness.      Hernia: No hernia is present.   Skin:     General: Skin is warm.      Coloration: Skin is not jaundiced.      Findings: No bruising.   Neurological:      General: No focal deficit present.      Mental Status: She is alert and oriented to person, place, and time.   Psychiatric:         Mood and Affect: Mood normal.         Behavior: Behavior normal.         Thought Content: Thought content normal.           Cory Farris DO   Family Medicine PGY-3  9/18/2024       "

## 2024-09-18 NOTE — ASSESSMENT & PLAN NOTE
Wt Readings from Last 3 Encounters:   09/18/24 81.8 kg (180 lb 6.4 oz)   09/03/24 84.4 kg (186 lb 1.1 oz)   07/17/24 84.6 kg (186 lb 6.4 oz)   Doing well on Wegovy.  Continue current medication regimen.  Continue lifestyle changes.  Follow-up in 3 months.

## 2024-10-07 ENCOUNTER — TELEPHONE (OUTPATIENT)
Dept: BARIATRICS | Facility: CLINIC | Age: 29
End: 2024-10-07

## 2024-10-31 ENCOUNTER — HOSPITAL ENCOUNTER (EMERGENCY)
Facility: HOSPITAL | Age: 29
Discharge: HOME | End: 2024-10-31
Attending: INTERNAL MEDICINE
Payer: COMMERCIAL

## 2024-10-31 ENCOUNTER — APPOINTMENT (EMERGENCY)
Dept: CT IMAGING | Facility: HOSPITAL | Age: 29
End: 2024-10-31
Payer: COMMERCIAL

## 2024-10-31 VITALS
SYSTOLIC BLOOD PRESSURE: 106 MMHG | WEIGHT: 182.98 LBS | TEMPERATURE: 98.5 F | OXYGEN SATURATION: 100 % | HEART RATE: 94 BPM | BODY MASS INDEX: 32.41 KG/M2 | RESPIRATION RATE: 18 BRPM | DIASTOLIC BLOOD PRESSURE: 55 MMHG

## 2024-10-31 DIAGNOSIS — K76.0 FATTY LIVER: ICD-10-CM

## 2024-10-31 DIAGNOSIS — K52.9 GASTROENTERITIS: Primary | ICD-10-CM

## 2024-10-31 LAB
ALBUMIN SERPL BCG-MCNC: 4.3 G/DL (ref 3.5–5)
ALP SERPL-CCNC: 47 U/L (ref 34–104)
ALT SERPL W P-5'-P-CCNC: 11 U/L (ref 7–52)
ANION GAP SERPL CALCULATED.3IONS-SCNC: 5 MMOL/L (ref 4–13)
AST SERPL W P-5'-P-CCNC: 10 U/L (ref 13–39)
BASOPHILS # BLD AUTO: 0.03 THOUSANDS/ΜL (ref 0–0.1)
BASOPHILS NFR BLD AUTO: 0 % (ref 0–1)
BILIRUB SERPL-MCNC: 0.6 MG/DL (ref 0.2–1)
BUN SERPL-MCNC: 9 MG/DL (ref 5–25)
CALCIUM SERPL-MCNC: 9.3 MG/DL (ref 8.4–10.2)
CHLORIDE SERPL-SCNC: 105 MMOL/L (ref 96–108)
CO2 SERPL-SCNC: 27 MMOL/L (ref 21–32)
CREAT SERPL-MCNC: 0.71 MG/DL (ref 0.6–1.3)
EOSINOPHIL # BLD AUTO: 0.12 THOUSAND/ΜL (ref 0–0.61)
EOSINOPHIL NFR BLD AUTO: 1 % (ref 0–6)
ERYTHROCYTE [DISTWIDTH] IN BLOOD BY AUTOMATED COUNT: 13.2 % (ref 11.6–15.1)
GFR SERPL CREATININE-BSD FRML MDRD: 114 ML/MIN/1.73SQ M
GLUCOSE SERPL-MCNC: 83 MG/DL (ref 65–140)
HCT VFR BLD AUTO: 39.3 % (ref 34.8–46.1)
HGB BLD-MCNC: 12.9 G/DL (ref 11.5–15.4)
IMM GRANULOCYTES # BLD AUTO: 0.03 THOUSAND/UL (ref 0–0.2)
IMM GRANULOCYTES NFR BLD AUTO: 0 % (ref 0–2)
LIPASE SERPL-CCNC: 15 U/L (ref 11–82)
LYMPHOCYTES # BLD AUTO: 1.8 THOUSANDS/ΜL (ref 0.6–4.47)
LYMPHOCYTES NFR BLD AUTO: 20 % (ref 14–44)
MCH RBC QN AUTO: 28 PG (ref 26.8–34.3)
MCHC RBC AUTO-ENTMCNC: 32.8 G/DL (ref 31.4–37.4)
MCV RBC AUTO: 85 FL (ref 82–98)
MONOCYTES # BLD AUTO: 0.5 THOUSAND/ΜL (ref 0.17–1.22)
MONOCYTES NFR BLD AUTO: 6 % (ref 4–12)
NEUTROPHILS # BLD AUTO: 6.5 THOUSANDS/ΜL (ref 1.85–7.62)
NEUTS SEG NFR BLD AUTO: 73 % (ref 43–75)
NRBC BLD AUTO-RTO: 0 /100 WBCS
PLATELET # BLD AUTO: 233 THOUSANDS/UL (ref 149–390)
PMV BLD AUTO: 10.2 FL (ref 8.9–12.7)
POTASSIUM SERPL-SCNC: 3.8 MMOL/L (ref 3.5–5.3)
PROT SERPL-MCNC: 6.9 G/DL (ref 6.4–8.4)
RBC # BLD AUTO: 4.61 MILLION/UL (ref 3.81–5.12)
SODIUM SERPL-SCNC: 137 MMOL/L (ref 135–147)
WBC # BLD AUTO: 8.98 THOUSAND/UL (ref 4.31–10.16)

## 2024-10-31 PROCEDURE — 74177 CT ABD & PELVIS W/CONTRAST: CPT

## 2024-10-31 PROCEDURE — 99284 EMERGENCY DEPT VISIT MOD MDM: CPT

## 2024-10-31 PROCEDURE — 36415 COLL VENOUS BLD VENIPUNCTURE: CPT | Performed by: PHYSICIAN ASSISTANT

## 2024-10-31 PROCEDURE — 85025 COMPLETE CBC W/AUTO DIFF WBC: CPT | Performed by: PHYSICIAN ASSISTANT

## 2024-10-31 PROCEDURE — 83690 ASSAY OF LIPASE: CPT | Performed by: PHYSICIAN ASSISTANT

## 2024-10-31 PROCEDURE — 99285 EMERGENCY DEPT VISIT HI MDM: CPT | Performed by: PHYSICIAN ASSISTANT

## 2024-10-31 PROCEDURE — 80053 COMPREHEN METABOLIC PANEL: CPT | Performed by: PHYSICIAN ASSISTANT

## 2024-10-31 PROCEDURE — 96374 THER/PROPH/DIAG INJ IV PUSH: CPT

## 2024-10-31 PROCEDURE — 96361 HYDRATE IV INFUSION ADD-ON: CPT

## 2024-10-31 RX ORDER — ONDANSETRON 4 MG/1
4 TABLET, ORALLY DISINTEGRATING ORAL EVERY 8 HOURS PRN
Qty: 12 TABLET | Refills: 0 | Status: SHIPPED | OUTPATIENT
Start: 2024-10-31

## 2024-10-31 RX ORDER — ONDANSETRON 4 MG/1
4 TABLET, ORALLY DISINTEGRATING ORAL ONCE
Status: COMPLETED | OUTPATIENT
Start: 2024-10-31 | End: 2024-10-31

## 2024-10-31 RX ORDER — ACETAMINOPHEN 10 MG/ML
1000 INJECTION, SOLUTION INTRAVENOUS ONCE
Status: COMPLETED | OUTPATIENT
Start: 2024-10-31 | End: 2024-10-31

## 2024-10-31 RX ORDER — DICYCLOMINE HCL 20 MG
20 TABLET ORAL EVERY 6 HOURS
Qty: 30 TABLET | Refills: 0 | Status: SHIPPED | OUTPATIENT
Start: 2024-10-31

## 2024-10-31 RX ADMIN — IOHEXOL 100 ML: 350 INJECTION, SOLUTION INTRAVENOUS at 15:09

## 2024-10-31 RX ADMIN — ACETAMINOPHEN 1000 MG: 10 INJECTION INTRAVENOUS at 13:47

## 2024-10-31 RX ADMIN — ONDANSETRON 4 MG: 4 TABLET, ORALLY DISINTEGRATING ORAL at 16:00

## 2024-10-31 RX ADMIN — SODIUM CHLORIDE 1000 ML: 0.9 INJECTION, SOLUTION INTRAVENOUS at 13:47

## 2024-10-31 NOTE — Clinical Note
Hetal Barahona was seen and treated in our emergency department on 10/31/2024.                Diagnosis:     Hetal  may return to work on return date.    She may return on this date: 11/04/2024         If you have any questions or concerns, please don't hesitate to call.      Anjana Fregoso RN    ______________________________           _______________          _______________  Hospital Representative                              Date                                Time

## 2024-10-31 NOTE — ED PROVIDER NOTES
Time reflects when diagnosis was documented in both MDM as applicable and the Disposition within this note       Time User Action Codes Description Comment    10/31/2024  3:51 PM Sofya Girard Add [K52.9] Gastroenteritis     10/31/2024  3:51 PM Sofya Girard Add [K76.0] Fatty liver           ED Disposition       ED Disposition   Discharge    Condition   Stable    Date/Time   Thu Oct 31, 2024  3:51 PM    Comment   Hetalzion Barahona discharge to home/self care.                   Assessment & Plan       Medical Decision Making  29y.o female presents to the ER for diffuse abdominal pain, nausea and vomiting since last night. Vitals are stable. Patient is in no acute distress. On exam, breathing is non-labored. No tachypnea or accessory muscle use. Lungs are clear. Heart is regular rate and rhythm. Abdomen is soft but tender on the right side. No guarding, rigidity, distention or pulsatile masses palpated. DDX consists of but not limited to: gastroenteritis, appendicitis, cholecystitis. Will check labs and imaging.    1427 WBC: 8.98    1427 Hemoglobin: 12.9    1427 Platelet Count: 233    1427 Comprehensive metabolic panel(!)    1427 LIPASE: 15    1545 CT abdomen pelvis with contrast  No acute inflammatory process.     Fatty liver.    1550      Informed patient of lab and imaging findings. Will discharge. Patient agreeable. Patient does report some nausea. Will give ODT Zofran before leaving.    The management plan was discussed in detail with the patient at bedside and all questions were answered.  Prior to discharge, we provided both verbal and written instructions.  We discussed with the patient the signs and symptoms for which to return to the emergency department.  All questions were answered and patient was comfortable with the plan of care and discharged to home.  Instructed the patient to follow up with the primary care provider and/or specialist provided and their written instructions.  The patient  verbalized understanding of our discussion and plan of care, and agrees to return to the Emergency Department for concerns and progression of illness.    At discharge, I instructed the patient to:  -follow up with pcp  -take Zofran and Bentyl as prescribed  -rest and drink plenty of fluids  -return to the ER if symptoms worsened or new symptoms arose  Patient agreed to this plan and was stable at time of discharge.     Problems Addressed:  Fatty liver: chronic illness or injury  Gastroenteritis: acute illness or injury    Amount and/or Complexity of Data Reviewed  Independent Historian:      Details: Patient is historian  Labs: ordered. Decision-making details documented in ED Course.  Radiology: ordered. Decision-making details documented in ED Course.    Risk  Prescription drug management.        ED Course as of 10/31/24 1602   Thu Oct 31, 2024   1427 WBC: 8.98   1427 Hemoglobin: 12.9   1427 Platelet Count: 233   1427 Comprehensive metabolic panel(!)   1427 LIPASE: 15   1545 CT abdomen pelvis with contrast  No acute inflammatory process.     Fatty liver.         Medications   sodium chloride 0.9 % bolus 1,000 mL (0 mL Intravenous Stopped 10/31/24 1503)   acetaminophen (Ofirmev) injection 1,000 mg (0 mg Intravenous Stopped 10/31/24 1402)   iohexol (OMNIPAQUE) 350 MG/ML injection (SINGLE-DOSE) 100 mL (100 mL Intravenous Given 10/31/24 1509)   ondansetron (ZOFRAN-ODT) dispersible tablet 4 mg (4 mg Oral Given 10/31/24 1600)       ED Risk Strat Scores                                               History of Present Illness       Chief Complaint   Patient presents with    Vomiting     Vomiting since 2300 last night, denies diarrhea. Decrease in appetite, urinating       History reviewed. No pertinent past medical history.   Past Surgical History:   Procedure Laterality Date     SECTION      TOOTH EXTRACTION      TUBAL LIGATION        History reviewed. No pertinent family history.   Social History     Tobacco Use     Smoking status: Former     Current packs/day: 0.20     Types: Cigarettes    Smokeless tobacco: Never   Vaping Use    Vaping status: Never Used   Substance Use Topics    Alcohol use: Not Currently    Drug use: Yes     Types: Marijuana      E-Cigarette/Vaping    E-Cigarette Use Never User       E-Cigarette/Vaping Substances      I have reviewed and agree with the history as documented.     29y.o female with no significant PMH presents to the ER for diffuse abdominal pain, nausea and vomiting since last night. Patient denies taking any medication for symptoms. She describes her pain as sharp and non-radiating. Pain is constant. Associated symptoms: diarrhea initially but now feels constipated. She denies fever, chills, URI symptoms, chest pain, dyspnea, urinary symptoms, weakness or paresthesias.      History provided by:  Patient   used: No        Review of Systems   Constitutional:  Negative for activity change, appetite change, chills and fever.   HENT:  Negative for congestion, drooling, ear discharge, ear pain, facial swelling, rhinorrhea and sore throat.    Eyes:  Negative for redness.   Respiratory:  Negative for cough and shortness of breath.    Cardiovascular:  Negative for chest pain.   Gastrointestinal:  Positive for abdominal pain, constipation, diarrhea, nausea and vomiting.   Genitourinary:  Negative for dysuria, frequency, hematuria and urgency.   Musculoskeletal:  Negative for neck stiffness.   Skin:  Negative for rash.   Neurological:  Negative for weakness and numbness.           Objective       ED Triage Vitals [10/31/24 1309]   Temperature Pulse Blood Pressure Respirations SpO2 Patient Position - Orthostatic VS   98.5 °F (36.9 °C) 96 132/93 18 100 % Sitting      Temp Source Heart Rate Source BP Location FiO2 (%) Pain Score    Oral Monitor Right arm -- --      Vitals      Date and Time Temp Pulse SpO2 Resp BP Pain Score FACES Pain Rating User   10/31/24 1554 -- 94 100 % 18  106/55 -- -- JR   10/31/24 1309 98.5 °F (36.9 °C) 96 100 % 18 132/93 -- -- AW            Physical Exam  Vitals and nursing note reviewed.   Constitutional:       General: She is not in acute distress.     Appearance: She is not toxic-appearing.   HENT:      Head: Normocephalic and atraumatic.      Mouth/Throat:      Lips: Pink. No lesions.      Mouth: Mucous membranes are moist.   Eyes:      Conjunctiva/sclera: Conjunctivae normal.   Neck:      Trachea: No tracheal deviation.   Cardiovascular:      Rate and Rhythm: Normal rate and regular rhythm.      Heart sounds: Normal heart sounds, S1 normal and S2 normal. No murmur heard.     No friction rub. No gallop.   Pulmonary:      Effort: Pulmonary effort is normal. No respiratory distress.      Breath sounds: Normal breath sounds. No decreased breath sounds, wheezing, rhonchi or rales.   Chest:      Chest wall: No tenderness.   Abdominal:      General: Bowel sounds are normal. There is no distension.      Palpations: Abdomen is soft.      Tenderness: There is abdominal tenderness in the right upper quadrant and right lower quadrant. There is no guarding or rebound.   Musculoskeletal:      Cervical back: Normal range of motion and neck supple.   Skin:     General: Skin is warm and dry.      Findings: No rash.   Neurological:      Mental Status: She is alert.      GCS: GCS eye subscore is 4. GCS verbal subscore is 5. GCS motor subscore is 6.   Psychiatric:         Mood and Affect: Mood normal.         Results Reviewed       Procedure Component Value Units Date/Time    Comprehensive metabolic panel [[id number]]  (Abnormal) Collected: 10/31/24 1345    Lab Status: Final result Specimen: Blood from Arm, Right Updated: 10/31/24 1411     Sodium 137 mmol/L      Potassium 3.8 mmol/L      Chloride 105 mmol/L      CO2 27 mmol/L      ANION GAP 5 mmol/L      BUN 9 mg/dL      Creatinine 0.71 mg/dL      Glucose 83 mg/dL      Calcium 9.3 mg/dL      AST 10 U/L      ALT 11 U/L       Alkaline Phosphatase 47 U/L      Total Protein 6.9 g/dL      Albumin 4.3 g/dL      Total Bilirubin 0.60 mg/dL      eGFR 114 ml/min/1.73sq m     Narrative:      National Kid[employer] Foundation guidelines for Chronic Kidney Disease (CKD):     Stage 1 with normal or high GFR (GFR > 90 mL/min/1.73 square meters)    Stage 2 Mild CKD (GFR = 60-89 mL/min/1.73 square meters)    Stage 3A Moderate CKD (GFR = 45-59 mL/min/1.73 square meters)    Stage 3B Moderate CKD (GFR = 30-44 mL/min/1.73 square meters)    Stage 4 Severe CKD (GFR = 15-29 mL/min/1.73 square meters)    Stage 5 End Stage CKD (GFR <15 mL/min/1.73 square meters)  Note: GFR calculation is accurate only with a steady state creatinine    Lipase [[id number]]  (Normal) Collected: 10/31/24 1345    Lab Status: Final result Specimen: Blood from Arm, Right Updated: 10/31/24 1411     Lipase 15 u/L     CBC and differential [[id number]] Collected: 10/31/24 1345    Lab Status: Final result Specimen: Blood from Arm, Right Updated: 10/31/24 1356     WBC 8.98 Thousand/uL      RBC 4.61 Million/uL      Hemoglobin 12.9 g/dL      Hematocrit 39.3 %      MCV 85 fL      MCH 28.0 pg      MCHC 32.8 g/dL      RDW 13.2 %      MPV 10.2 fL      Platelets 233 Thousands/uL      nRBC 0 /100 WBCs      Segmented % 73 %      Immature Grans % 0 %      Lymphocytes % 20 %      Monocytes % 6 %      Eosinophils Relative 1 %      Basophils Relative 0 %      Absolute Neutrophils 6.50 Thousands/µL      Absolute Immature Grans 0.03 Thousand/uL      Absolute Lymphocytes 1.80 Thousands/µL      Absolute Monocytes 0.50 Thousand/µL      Eosinophils Absolute 0.12 Thousand/µL      Basophils Absolute 0.03 Thousands/µL     POCT pregnancy, urine [[id number]]     Lab Status: No result             CT abdomen pelvis with contrast   Final Interpretation by Sudhir Tanner MD (10/31 1538)      No acute inflammatory process.      Fatty liver.         Workstation performed: [id number]             Procedures    ED Medication  and Procedure Management   Prior to Admission Medications   Prescriptions Last Dose Informant Patient Reported? Taking?   Semaglutide-Weight Management (WEGOVY) 2.4 MG/0.75ML   No No   Sig: Inject 0.75 mL (2.4 mg total) under the skin once a week   albuterol (PROVENTIL HFA,VENTOLIN HFA) 90 mcg/act inhaler   Yes No   Sig: Inhale 1 puff every 6 (six) hours as needed   erythromycin (ILOTYCIN) ophthalmic ointment   No No   Sig: Place a 1/2 inch ribbon of ointment into the lower eyelid.   ibuprofen (MOTRIN) 600 mg tablet   Yes No   Sig: Take 600 mg by mouth every 6 (six) hours as needed   ketorolac (ACULAR) 0.5 % ophthalmic solution   No No   Sig: Administer 1 drop into the left eye 4 (four) times a day for 5 days   loratadine (CLARITIN) 10 mg tablet   No No   Sig: Take 1 tablet (10 mg total) by mouth daily   ofloxacin (OCUFLOX) 0.3 % ophthalmic solution   No No   Si drops to each eye every 4 hours while awake for two days then 4 times a day for 5 days   ondansetron (ZOFRAN) 4 mg tablet   No No   Sig: Take 1 tablet (4 mg total) by mouth every 8 (eight) hours as needed for nausea or vomiting   ondansetron (ZOFRAN) 4 mg tablet   No No   Sig: Take 1 tablet (4 mg total) by mouth every 8 (eight) hours as needed for nausea or vomiting   tetrahydrozoline (VISINE) 0.05 % ophthalmic solution   No No   Sig: Administer 1 drop to the right eye 4 (four) times a day as needed for irritation      Facility-Administered Medications: None     Patient's Medications   Discharge Prescriptions    DICYCLOMINE (BENTYL) 20 MG TABLET    Take 1 tablet (20 mg total) by mouth every 6 (six) hours       Start Date: 10/31/2024End Date: --       Order Dose: 20 mg       Quantity: 30 tablet    Refills: 0    ONDANSETRON (ZOFRAN-ODT) 4 MG DISINTEGRATING TABLET    Take 1 tablet (4 mg total) by mouth every 8 (eight) hours as needed for nausea or vomiting       Start Date: 10/31/2024End Date: --       Order Dose: 4 mg       Quantity: 12 tablet    Refills:  0     No discharge procedures on file.  ED SEPSIS DOCUMENTATION   Time reflects when diagnosis was documented in both MDM as applicable and the Disposition within this note       Time User Action Codes Description Comment    10/31/2024  3:51 PM Sofya Girard Add [K52.9] Gastroenteritis     10/31/2024  3:51 PM Sofya Girard Add [K76.0] Fatty liver                  Sofya Girard PA-C  10/31/24 4942

## 2024-10-31 NOTE — DISCHARGE INSTRUCTIONS
DISCHARGE INSTRUCTIONS:    FOLLOW UP WITH YOUR PRIMARY CARE PROVIDER OR THE [facility]. MAKE AN APPOINTMENT TO BE SEEN.     TAKE MEDICATION AS PRESCRIBED. IF RASH, SHORTNESS OF BREATH OR TROUBLE SWALLOWING, STOP TAKING THE MEDICATION AND BE SEEN.     REST AND DRINK PLENTY OF FLUIDS.    IF SYMPTOMS WORSEN OR NEW SYMPTOMS ARISE, RETURN TO THE ER TO BE SEEN.

## 2024-12-06 ENCOUNTER — TELEPHONE (OUTPATIENT)
Dept: FAMILY MEDICINE CLINIC | Facility: CLINIC | Age: 29
End: 2024-12-06

## 2024-12-06 RX ORDER — SEMAGLUTIDE 2.4 MG/.75ML
2.4 INJECTION, SOLUTION SUBCUTANEOUS WEEKLY
Qty: 3 ML | Refills: 0 | OUTPATIENT
Start: 2024-12-06

## 2024-12-06 NOTE — TELEPHONE ENCOUNTER
Patient called office today stating that per pharmacy Wegovy need to be resent. Pt is requesting for medication to be sent to [facility] on [address] listed on chart. Please advise. Thank you!

## 2024-12-12 ENCOUNTER — TELEPHONE (OUTPATIENT)
Dept: FAMILY MEDICINE CLINIC | Facility: CLINIC | Age: 29
End: 2024-12-12

## 2024-12-12 NOTE — TELEPHONE ENCOUNTER
Left message to inform patient appointment on 12/19 needs to be rescheduled. If patient calls back, please assist. Thank you.

## 2025-01-13 ENCOUNTER — APPOINTMENT (OUTPATIENT)
Dept: LAB | Facility: CLINIC | Age: 30
End: 2025-01-13
Payer: COMMERCIAL

## 2025-01-13 ENCOUNTER — ANNUAL EXAM (OUTPATIENT)
Dept: FAMILY MEDICINE CLINIC | Facility: CLINIC | Age: 30
End: 2025-01-13

## 2025-01-13 VITALS
DIASTOLIC BLOOD PRESSURE: 80 MMHG | BODY MASS INDEX: 31.89 KG/M2 | TEMPERATURE: 97.6 F | SYSTOLIC BLOOD PRESSURE: 110 MMHG | WEIGHT: 180 LBS | RESPIRATION RATE: 16 BRPM | HEIGHT: 63 IN | OXYGEN SATURATION: 99 % | HEART RATE: 95 BPM

## 2025-01-13 DIAGNOSIS — J45.20 MILD INTERMITTENT ASTHMA WITHOUT COMPLICATION: ICD-10-CM

## 2025-01-13 DIAGNOSIS — Z12.4 SCREENING FOR CERVICAL CANCER: ICD-10-CM

## 2025-01-13 DIAGNOSIS — L85.8 KERATOSIS PILARIS, ACQUIRED: ICD-10-CM

## 2025-01-13 DIAGNOSIS — Z00.6 ENCOUNTER FOR EXAMINATION FOR NORMAL COMPARISON OR CONTROL IN CLINICAL RESEARCH PROGRAM: ICD-10-CM

## 2025-01-13 DIAGNOSIS — Z87.891 QUIT SMOKING WITHIN PAST YEAR: ICD-10-CM

## 2025-01-13 DIAGNOSIS — N92.0 EXCESSIVE OR FREQUENT MENSTRUATION: Primary | ICD-10-CM

## 2025-01-13 DIAGNOSIS — L21.9 SEBORRHEIC DERMATITIS: ICD-10-CM

## 2025-01-13 DIAGNOSIS — Z00.00 ANNUAL PHYSICAL EXAM: Primary | ICD-10-CM

## 2025-01-13 DIAGNOSIS — L21.0 DANDRUFF IN ADULT: ICD-10-CM

## 2025-01-13 DIAGNOSIS — N92.0 MENORRHAGIA WITH REGULAR CYCLE: ICD-10-CM

## 2025-01-13 PROBLEM — F17.200 TOBACCO DEPENDENCE: Status: RESOLVED | Noted: 2020-06-15 | Resolved: 2025-01-13

## 2025-01-13 PROBLEM — S93.402A SPRAIN OF UNSPECIFIED LIGAMENT OF LEFT ANKLE, INITIAL ENCOUNTER: Status: RESOLVED | Noted: 2020-01-15 | Resolved: 2025-01-13

## 2025-01-13 PROBLEM — A60.04 HERPES SIMPLEX VULVOVAGINITIS: Status: ACTIVE | Noted: 2017-03-28

## 2025-01-13 PROBLEM — F41.8 DEPRESSION WITH ANXIETY: Status: ACTIVE | Noted: 2017-01-26

## 2025-01-13 PROBLEM — Z59.87 INADEQUATE MATERIAL RESOURCES: Status: ACTIVE | Noted: 2018-04-18

## 2025-01-13 PROBLEM — F41.8 DEPRESSION WITH ANXIETY: Status: RESOLVED | Noted: 2017-01-26 | Resolved: 2025-01-13

## 2025-01-13 PROBLEM — F17.200 TOBACCO DEPENDENCE: Status: ACTIVE | Noted: 2020-06-15

## 2025-01-13 LAB
BASOPHILS # BLD AUTO: 0.03 THOUSANDS/ΜL (ref 0–0.1)
BASOPHILS NFR BLD AUTO: 1 % (ref 0–1)
EOSINOPHIL # BLD AUTO: 0.1 THOUSAND/ΜL (ref 0–0.61)
EOSINOPHIL NFR BLD AUTO: 2 % (ref 0–6)
ERYTHROCYTE [DISTWIDTH] IN BLOOD BY AUTOMATED COUNT: 13 % (ref 11.6–15.1)
HCT VFR BLD AUTO: 38.3 % (ref 34.8–46.1)
HGB BLD-MCNC: 12.4 G/DL (ref 11.5–15.4)
IMM GRANULOCYTES # BLD AUTO: 0.01 THOUSAND/UL (ref 0–0.2)
IMM GRANULOCYTES NFR BLD AUTO: 0 % (ref 0–2)
LYMPHOCYTES # BLD AUTO: 1.95 THOUSANDS/ΜL (ref 0.6–4.47)
LYMPHOCYTES NFR BLD AUTO: 39 % (ref 14–44)
MCH RBC QN AUTO: 27.7 PG (ref 26.8–34.3)
MCHC RBC AUTO-ENTMCNC: 32.4 G/DL (ref 31.4–37.4)
MCV RBC AUTO: 86 FL (ref 82–98)
MONOCYTES # BLD AUTO: 0.34 THOUSAND/ΜL (ref 0.17–1.22)
MONOCYTES NFR BLD AUTO: 7 % (ref 4–12)
NEUTROPHILS # BLD AUTO: 2.64 THOUSANDS/ΜL (ref 1.85–7.62)
NEUTS SEG NFR BLD AUTO: 51 % (ref 43–75)
NRBC BLD AUTO-RTO: 0 /100 WBCS
PLATELET # BLD AUTO: 233 THOUSANDS/UL (ref 149–390)
PMV BLD AUTO: 10.9 FL (ref 8.9–12.7)
RBC # BLD AUTO: 4.47 MILLION/UL (ref 3.81–5.12)
WBC # BLD AUTO: 5.07 THOUSAND/UL (ref 4.31–10.16)

## 2025-01-13 PROCEDURE — G0476 HPV COMBO ASSAY CA SCREEN: HCPCS | Performed by: STUDENT IN AN ORGANIZED HEALTH CARE EDUCATION/TRAINING PROGRAM

## 2025-01-13 PROCEDURE — 36415 COLL VENOUS BLD VENIPUNCTURE: CPT

## 2025-01-13 PROCEDURE — 85025 COMPLETE CBC W/AUTO DIFF WBC: CPT

## 2025-01-13 PROCEDURE — 99395 PREV VISIT EST AGE 18-39: CPT

## 2025-01-13 PROCEDURE — G0145 SCR C/V CYTO,THINLAYER,RESCR: HCPCS | Performed by: STUDENT IN AN ORGANIZED HEALTH CARE EDUCATION/TRAINING PROGRAM

## 2025-01-13 RX ORDER — ALBUTEROL SULFATE 90 UG/1
1 INHALANT RESPIRATORY (INHALATION) EVERY 6 HOURS PRN
Qty: 18 G | Refills: 1 | Status: SHIPPED | OUTPATIENT
Start: 2025-01-13

## 2025-01-13 RX ORDER — KETOCONAZOLE 20 MG/ML
1 SHAMPOO, SUSPENSION TOPICAL 2 TIMES WEEKLY
Qty: 120 ML | Refills: 2 | Status: SHIPPED | OUTPATIENT
Start: 2025-01-13

## 2025-01-13 RX ORDER — AMMONIUM LACTATE 12 G/100G
LOTION TOPICAL 2 TIMES DAILY PRN
Qty: 225 G | Refills: 1 | Status: SHIPPED | OUTPATIENT
Start: 2025-01-13

## 2025-01-13 NOTE — ASSESSMENT & PLAN NOTE
Patient with very heavy periods.  No history of anemia, reporting recent very heavy periods and by her standards.  Patient can consider IUD or other hormonal options.  - Will discuss options for abnormal uterine bleeding next visit  Orders:    CBC and differential; Future

## 2025-01-13 NOTE — ASSESSMENT & PLAN NOTE
Patient with slight rash on bilateral arms secondary to this  Orders:    ammonium lactate (LAC-HYDRIN) 12 % lotion; Apply topically 2 (two) times a day as needed for dry skin

## 2025-01-13 NOTE — PROGRESS NOTES
Adult Annual Physical  Name: Hetal Barahona      : 1995      MRN: [id number]  Encounter Provider: Elias Schoen, MD  Encounter Date: 2025   Encounter department: [facility]    Assessment & Plan  Annual physical exam  Discussed patient's health in depth, no significant areas needing attention at this time  - Patient quit soda and cigarettes in the past 2 years which has greatly improved patient's health prospects.       Mild intermittent asthma without complication  Patient with symptoms only during viral illnesses, no symptoms at this time.  Requesting medication on hand in case of exacerbation.  Orders:    albuterol (PROVENTIL HFA,VENTOLIN HFA) 90 mcg/act inhaler; Inhale 1 puff every 6 (six) hours as needed for wheezing    Menorrhagia with regular cycle  Patient with very heavy periods.  No history of anemia, reporting recent very heavy periods and by her standards.  Patient can consider IUD or other hormonal options.  - Will discuss options for abnormal uterine bleeding next visit  Orders:    CBC and differential; Future    BMI 31  Patient has lost significant amount of weight on Wegovy.  Patient on max dosing no significant side effects noted, requesting refill medication at this time.  Orders:    Semaglutide-Weight Management (WEGOVY) 2.4 MG/0.75ML; Inject 0.75 mL (2.4 mg total) under the skin once a week    Keratosis pilaris, acquired  Patient with slight rash on bilateral arms secondary to this  Orders:    ammonium lactate (LAC-HYDRIN) 12 % lotion; Apply topically 2 (two) times a day as needed for dry skin    Quit smoking within past year  Continued abstinence       Screening for cervical cancer    Orders:    Liquid-based pap, screening; Future    Immunizations and preventive care screenings were discussed with patient today. Appropriate education was printed on patient's after visit summary.    Counseling:  Alcohol/drug use: discussed moderation in  alcohol intake, the recommendations for healthy alcohol use, and avoidance of illicit drug use.  Dental Health: discussed importance of regular tooth brushing, flossing, and dental visits.  Injury prevention: discussed safety/seat belts, safety helmets, smoke detectors, carbon monoxide detectors, and smoking near bedding or upholstery.  Sexual health: discussed sexually transmitted diseases, partner selection, use of condoms, avoidance of unintended pregnancy, and contraceptive alternatives.  Exercise: the importance of regular exercise/physical activity was discussed. Recommend exercise 3-5 times per week for at least 30 minutes.          History of Present Illness     Adult Annual Physical:  Patient presents for annual physical.     Diet and Physical Activity:  - Diet/Nutrition: well balanced diet, consuming 3-5 servings of fruits/vegetables daily, adequate whole grain intake, adequate fiber intake and limited junk food.  - Exercise: moderate cardiovascular exercise and less than 30 minutes on average.    General Health:  - Sleep: 7-8 hours of sleep on average.  - Hearing: normal hearing bilateral ears.  - Vision: goes for regular eye exams and wears glasses.  - Dental: regular dental visits.    /GYN Health:  - Follows with GYN: no.   - Menopause: premenopausal.   - Last menstrual cycle: 1/9/2025.   - History of STDs: no  - Contraception:. tubal ligation 2018      Review of Systems   Constitutional:  Negative for chills and fever.   HENT:  Negative for sore throat.    Respiratory:  Negative for shortness of breath.    Cardiovascular:  Negative for chest pain and palpitations.   Gastrointestinal:  Negative for abdominal pain.   Genitourinary:  Negative for dysuria.   Musculoskeletal:  Negative for myalgias.   Neurological:  Negative for weakness.         Objective   /80 (BP Location: Right arm, Patient Position: Sitting, Cuff Size: Standard)   Pulse 95   Temp 97.6 °F (36.4 °C) (Temporal)   Resp 16   Ht  "5' 3\" (1.6 m)   Wt 81.6 kg (180 lb)   LMP 01/02/2025   SpO2 99%   BMI 31.89 kg/m²     Physical Exam  Vitals and nursing note reviewed. Exam conducted with a chaperone present.   Constitutional:       General: She is not in acute distress.     Appearance: Normal appearance. She is well-developed.   HENT:      Head: Normocephalic and atraumatic.   Eyes:      Conjunctiva/sclera: Conjunctivae normal.   Cardiovascular:      Rate and Rhythm: Normal rate and regular rhythm.      Heart sounds: No murmur heard.  Pulmonary:      Effort: Pulmonary effort is normal. No respiratory distress.      Breath sounds: Normal breath sounds.   Abdominal:      Palpations: Abdomen is soft.      Tenderness: There is no abdominal tenderness.   Genitourinary:     Exam position: Lithotomy position.      Vagina: Normal.      Cervix: Friability present. No cervical motion tenderness.      Uterus: Normal.       Adnexa: Right adnexa normal and left adnexa normal.   Musculoskeletal:         General: No swelling. Normal range of motion.      Cervical back: Normal range of motion and neck supple.   Skin:     General: Skin is warm and dry.      Capillary Refill: Capillary refill takes less than 2 seconds.      Findings: Rash (on upper arms) present.   Neurological:      General: No focal deficit present.      Mental Status: She is alert and oriented to person, place, and time.   Psychiatric:         Mood and Affect: Mood normal.         "

## 2025-01-14 ENCOUNTER — RESULTS FOLLOW-UP (OUTPATIENT)
Dept: FAMILY MEDICINE CLINIC | Facility: CLINIC | Age: 30
End: 2025-01-14

## 2025-01-14 LAB
HPV HR 12 DNA CVX QL NAA+PROBE: NEGATIVE
HPV16 DNA CVX QL NAA+PROBE: NEGATIVE
HPV18 DNA CVX QL NAA+PROBE: NEGATIVE

## 2025-01-17 LAB
LAB AP GYN PRIMARY INTERPRETATION: NORMAL
Lab: NORMAL

## 2025-01-18 ENCOUNTER — RESULTS FOLLOW-UP (OUTPATIENT)
Dept: FAMILY MEDICINE CLINIC | Facility: CLINIC | Age: 30
End: 2025-01-18

## 2025-01-24 LAB
APOB+LDLR+PCSK9 GENE MUT ANL BLD/T: NOT DETECTED
BRCA1+BRCA2 DEL+DUP + FULL MUT ANL BLD/T: NOT DETECTED
MLH1+MSH2+MSH6+PMS2 GN DEL+DUP+FUL M: NOT DETECTED

## 2025-03-03 ENCOUNTER — TELEPHONE (OUTPATIENT)
Dept: FAMILY MEDICINE CLINIC | Facility: CLINIC | Age: 30
End: 2025-03-03

## 2025-03-03 NOTE — TELEPHONE ENCOUNTER
Left message to inform patient appointment on 3/7 needs to be rescheduled. If patient calls back, please assist. Thank you.

## 2025-03-04 ENCOUNTER — TELEPHONE (OUTPATIENT)
Dept: FAMILY MEDICINE CLINIC | Facility: CLINIC | Age: 30
End: 2025-03-04

## 2025-03-04 NOTE — TELEPHONE ENCOUNTER
Hello good morning or afternoon my name is. I was on a phone call with someone about rescheduling my appointment and I lost the call. My birthday is 1995. My phone number is [phone]. If you please give me a call back that'd be great. Thanks so much. Bye bye.      Appointment was rescheduled.

## 2025-03-10 ENCOUNTER — OFFICE VISIT (OUTPATIENT)
Dept: FAMILY MEDICINE CLINIC | Facility: CLINIC | Age: 30
End: 2025-03-10

## 2025-03-10 VITALS
HEIGHT: 63 IN | OXYGEN SATURATION: 98 % | TEMPERATURE: 98 F | WEIGHT: 173.9 LBS | SYSTOLIC BLOOD PRESSURE: 106 MMHG | HEART RATE: 93 BPM | DIASTOLIC BLOOD PRESSURE: 72 MMHG | BODY MASS INDEX: 30.81 KG/M2 | RESPIRATION RATE: 18 BRPM

## 2025-03-10 DIAGNOSIS — R05.1 ACUTE COUGH: Primary | ICD-10-CM

## 2025-03-10 DIAGNOSIS — S05.01XA CORNEAL ABRASION, RIGHT, INITIAL ENCOUNTER: ICD-10-CM

## 2025-03-10 DIAGNOSIS — Z23 ENCOUNTER FOR IMMUNIZATION: ICD-10-CM

## 2025-03-10 PROBLEM — Z98.51 S/P TUBAL LIGATION: Status: ACTIVE | Noted: 2025-03-10

## 2025-03-10 PROCEDURE — 90673 RIV3 VACCINE NO PRESERV IM: CPT

## 2025-03-10 PROCEDURE — 99213 OFFICE O/P EST LOW 20 MIN: CPT

## 2025-03-10 PROCEDURE — 90677 PCV20 VACCINE IM: CPT

## 2025-03-10 PROCEDURE — 90471 IMMUNIZATION ADMIN: CPT

## 2025-03-10 PROCEDURE — 90472 IMMUNIZATION ADMIN EACH ADD: CPT

## 2025-03-10 RX ORDER — LORATADINE 10 MG/1
10 TABLET ORAL DAILY
Qty: 60 TABLET | Refills: 1 | Status: SHIPPED | OUTPATIENT
Start: 2025-03-10

## 2025-03-10 RX ORDER — PREDNISONE 20 MG/1
20 TABLET ORAL DAILY
Qty: 3 TABLET | Refills: 0 | Status: SHIPPED | OUTPATIENT
Start: 2025-03-10 | End: 2025-03-13

## 2025-03-10 RX ORDER — GUAIFENESIN/DEXTROMETHORPHAN 100-10MG/5
5 SYRUP ORAL 3 TIMES DAILY PRN
Qty: 473 ML | Refills: 0 | Status: SHIPPED | OUTPATIENT
Start: 2025-03-10

## 2025-03-10 NOTE — PROGRESS NOTES
Name: Hetal Barahona      : 1995      MRN: [id number]  Encounter Provider: Elias Schoen, MD  Encounter Date: 3/10/2025   Encounter department: [facility]  :  Assessment & Plan  Acute cough  Patient has a history of bronchiolitis, not getting significant relief from her albuterol.  Transmitted upper airway noises on lung exam consistent with mucous plugging.  Vital signs normal.  - Symptomatic management as below  - Patient may trial low-dose prednisone burst for help with resolution of airway inflammation which will hopefully help with her coughing that is keeping her up at night and causing significant distress  - Patient to follow-up if symptoms not improving or worsening in the next several days  - Work note provided  Orders:    dextromethorphan-guaiFENesin (ROBITUSSIN DM)  mg/5 mL syrup; Take 5 mL by mouth 3 (three) times a day as needed for cough    predniSONE 20 mg tablet; Take 1 tablet (20 mg total) by mouth daily for 3 days    loratadine (CLARITIN) 10 mg tablet; Take 1 tablet (10 mg total) by mouth daily    Encounter for immunization    Orders:    Pneumococcal Conjugate Vaccine 20-valent (Pcv20)    influenza vaccine, recombinant, PF, 0.5 mL IM (Flublok)           History of Present Illness   Cough  This is a new problem. The current episode started in the past 7 days. The problem has been gradually worsening. The problem occurs every few minutes. Pertinent negatives include no chest pain, chills, fever, myalgias, rash, sore throat, shortness of breath or wheezing.     Review of Systems   Constitutional:  Positive for fatigue. Negative for chills and fever.   HENT:  Negative for congestion and sore throat.    Respiratory:  Positive for cough. Negative for shortness of breath and wheezing.    Cardiovascular:  Negative for chest pain and palpitations.   Gastrointestinal:  Negative for abdominal pain.   Genitourinary:  Negative for dysuria.  "  Musculoskeletal:  Negative for myalgias.   Skin:  Negative for rash.   Neurological:  Negative for weakness.       Objective   /72 (BP Location: Left arm, Patient Position: Sitting, Cuff Size: Standard)   Pulse 93   Temp 98 °F (36.7 °C) (Temporal)   Resp 18   Ht 5' 3\" (1.6 m)   Wt 78.9 kg (173 lb 14.4 oz)   LMP 03/01/2025 (Approximate)   SpO2 98%   BMI 30.81 kg/m²      Physical Exam  Constitutional:       General: She is not in acute distress.     Appearance: Normal appearance.   Cardiovascular:      Rate and Rhythm: Normal rate.      Heart sounds: Normal heart sounds.   Pulmonary:      Effort: No respiratory distress.      Breath sounds: Rhonchi present. No wheezing.   Musculoskeletal:         General: Normal range of motion.      Cervical back: Normal range of motion.   Neurological:      General: No focal deficit present.      Mental Status: She is alert and oriented to person, place, and time.         "